# Patient Record
Sex: FEMALE | Race: WHITE | NOT HISPANIC OR LATINO | Employment: OTHER | URBAN - METROPOLITAN AREA
[De-identification: names, ages, dates, MRNs, and addresses within clinical notes are randomized per-mention and may not be internally consistent; named-entity substitution may affect disease eponyms.]

---

## 2018-12-20 ENCOUNTER — TELEPHONE (OUTPATIENT)
Dept: UROGYNECOLOGY | Facility: CLINIC | Age: 73
End: 2018-12-20

## 2018-12-20 NOTE — TELEPHONE ENCOUNTER
----- Message from Celena Thomas sent at 12/20/2018  4:01 PM CST -----  Contact: Yesy  Type: Needs Medical Advice    Who Called:  patient  Best Call Back Number: [phone]  Additional Information: patient has been referred by Dr. Murdock for a consult for a bladder lift--patient would like to get in as soon as possible but also has several questions regarding the procedure--please advise--thank you

## 2019-01-22 ENCOUNTER — INITIAL CONSULT (OUTPATIENT)
Dept: UROGYNECOLOGY | Facility: CLINIC | Age: 74
End: 2019-01-22
Payer: MEDICARE

## 2019-01-22 VITALS
DIASTOLIC BLOOD PRESSURE: 84 MMHG | SYSTOLIC BLOOD PRESSURE: 141 MMHG | HEIGHT: 65 IN | HEART RATE: 65 BPM | WEIGHT: 141.75 LBS | BODY MASS INDEX: 23.62 KG/M2

## 2019-01-22 DIAGNOSIS — N81.10 CYSTOCELE WITH RECTOCELE: ICD-10-CM

## 2019-01-22 DIAGNOSIS — R39.15 URINARY URGENCY: ICD-10-CM

## 2019-01-22 DIAGNOSIS — N81.6 CYSTOCELE WITH RECTOCELE: ICD-10-CM

## 2019-01-22 DIAGNOSIS — N81.4 UTERINE PROLAPSE: ICD-10-CM

## 2019-01-22 PROCEDURE — 99204 OFFICE O/P NEW MOD 45 MIN: CPT | Mod: S$PBB,,, | Performed by: OBSTETRICS & GYNECOLOGY

## 2019-01-22 PROCEDURE — 99213 OFFICE O/P EST LOW 20 MIN: CPT | Mod: PBBFAC,PO | Performed by: OBSTETRICS & GYNECOLOGY

## 2019-01-22 PROCEDURE — 99204 PR OFFICE/OUTPT VISIT, NEW, LEVL IV, 45-59 MIN: ICD-10-PCS | Mod: S$PBB,,, | Performed by: OBSTETRICS & GYNECOLOGY

## 2019-01-22 PROCEDURE — 99999 PR PBB SHADOW E&M-EST. PATIENT-LVL III: ICD-10-PCS | Mod: PBBFAC,,, | Performed by: OBSTETRICS & GYNECOLOGY

## 2019-01-22 PROCEDURE — 99999 PR PBB SHADOW E&M-EST. PATIENT-LVL III: CPT | Mod: PBBFAC,,, | Performed by: OBSTETRICS & GYNECOLOGY

## 2019-01-22 RX ORDER — RAMIPRIL 2.5 MG/1
2.5 CAPSULE ORAL DAILY
COMMUNITY
Start: 2018-12-04 | End: 2021-10-20 | Stop reason: SDUPTHER

## 2019-01-22 RX ORDER — ROSUVASTATIN CALCIUM 10 MG/1
10 TABLET, COATED ORAL
COMMUNITY
End: 2020-11-27

## 2019-01-22 RX ORDER — OMEGA-3-ACID ETHYL ESTERS 1 G/1
1 CAPSULE, LIQUID FILLED ORAL
COMMUNITY
End: 2019-03-21

## 2019-01-22 NOTE — LETTER
January 22, 2019      Saul Murdock MD  [address] [address]           [facility]  [address], [address]  [place] [address]  Phone: [phone]  Fax: [phone]          Patient: Yesy Knight   MR Number: [id number]   YOB: 1945   Date of Visit: 1/22/2019       Dear Dr. Saul Murdock:    Thank you for referring Yesy Knight to me for evaluation. Attached you will find relevant portions of my assessment and plan of care.    If you have questions, please do not hesitate to call me. I look forward to following Yesy Knight along with you.    Sincerely,    Rafael Guevara MD    Enclosure  CC:  No Recipients    If you would like to receive this communication electronically, please contact [email or web address] or [phone] to request more information on [facility] Link access.    For providers and/or their staff who would like to refer a patient to [facility], please contact us through our one-stop-shop provider referral line, [facility], at [phone].    If you feel you have received this communication in error or would no longer like to receive these types of communications, please e-mail [email or web address]

## 2019-01-22 NOTE — PROGRESS NOTES
Subjective:     Chief Complaint:   History of Present Illness: Yesy Knight is a 73 y.o. female patient of Dr. Murdock who presents for prolapse.  This is worsened over the past few years and she notices a balloon like defect at the introitus.  She does not light touch at so she is not exam did herself very well.  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  She has to squat to do this.  Her  has had a stroke so he cannot do the physical activity.  She is not sexually active because of his health issues.  She is not on any hormones presently.  She only has minimal incontinence if she waits too long but denies stress incontinence including with the heavy lifting.  She has no recent UTIs.  She does not want a vaginal mesh     Review of Systems    Constitutional: No acute distress. No weight gain/loss.  Eyes: No vision changes.  ENT: No headaches.   Respiratory: Non smoker  Cardiovascular: No chest pain. No edema. Hypertension  Gastrointestinal:  GERD   Genitourinary: No vaginal bleeding or discharge.  Integument/Breast: Negative  Hematologic/Lymphatic: No history of anemia.  Musculoskeletal: No major back pain. No abdominal pain.  Neurological: No known disc problems. No paresthesias.  Behavioral/Psych: No history of depression.   Endocrine: No hormonal replacement.  Allergy/Immune: no recent reactions     Objective:   General Exam:  General appearance: WDNF. NAD.   HEENT: Katie. EOM's intact.  Neck: Normal thyroid.   Back: No CVA tenderness.  RESP: No SOB.  Breasts: deferred  Abdomen: Benign without localizing signs.  Extremities: No edema. No varices.  Lymphatic: noncontributory  Skin: No rashes. No lesions.  Neurologic: Intact.   Psych: Oriented.   Pelvic Exam:  V:  No lesions. No palpable nodes.   Va:No d/c bleeding or lesions.  Dominant midline cystocele.Ba+1 standing.  Moderate rectocele  .Meatus:No caruncle or stenosis  Urethra: Non tender. No  suburethral masses.  Cx/Cuff: Normal   Uterus:  Prolapsesminus 5 position with elevation of the bladder  Ad: No mass or tenderness.  Levators :Symmetrical. Normal tone. Non tender.2/5 contraction  BL: Non tender  RV: No hemorrhoids.  Assessment:   Prolapse and pelvic relaxation.  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  She is not sexually active and partial colpocleisis may be ideal.  We talked about her options and risk involved.  Not a good candidate for pessary because of her physical activities.       Plan:    TVH,APR,partial colpocleisis pending CMG for occult incontinence

## 2019-02-06 ENCOUNTER — OFFICE VISIT (OUTPATIENT)
Dept: UROGYNECOLOGY | Facility: CLINIC | Age: 74
End: 2019-02-06
Payer: MEDICARE

## 2019-02-06 ENCOUNTER — APPOINTMENT (OUTPATIENT)
Dept: LAB | Facility: HOSPITAL | Age: 74
End: 2019-02-06
Attending: NURSE PRACTITIONER
Payer: MEDICARE

## 2019-02-06 VITALS
SYSTOLIC BLOOD PRESSURE: 149 MMHG | HEART RATE: 73 BPM | WEIGHT: 144.81 LBS | DIASTOLIC BLOOD PRESSURE: 89 MMHG | BODY MASS INDEX: 24.12 KG/M2 | HEIGHT: 65 IN

## 2019-02-06 DIAGNOSIS — R35.0 URINARY FREQUENCY: Primary | ICD-10-CM

## 2019-02-06 DIAGNOSIS — R31.21 ASYMPTOMATIC MICROSCOPIC HEMATURIA: ICD-10-CM

## 2019-02-06 DIAGNOSIS — N81.11 CYSTOCELE, MIDLINE: ICD-10-CM

## 2019-02-06 DIAGNOSIS — N81.4 UTERINE PROLAPSE: ICD-10-CM

## 2019-02-06 DIAGNOSIS — N81.6 RECTOCELE: ICD-10-CM

## 2019-02-06 LAB
BILIRUB SERPL-MCNC: ABNORMAL MG/DL
BLOOD URINE, POC: 250
COLOR, POC UA: YELLOW
GLUCOSE UR QL STRIP: ABNORMAL
KETONES UR QL STRIP: ABNORMAL
LEUKOCYTE ESTERASE URINE, POC: ABNORMAL
NITRITE, POC UA: ABNORMAL
PH, POC UA: 5
PROTEIN, POC: ABNORMAL
SPECIFIC GRAVITY, POC UA: 1.02
UROBILINOGEN, POC UA: ABNORMAL

## 2019-02-06 PROCEDURE — 87086 URINE CULTURE/COLONY COUNT: CPT

## 2019-02-06 PROCEDURE — 99214 OFFICE O/P EST MOD 30 MIN: CPT | Mod: S$PBB,25,, | Performed by: NURSE PRACTITIONER

## 2019-02-06 PROCEDURE — 81002 URINALYSIS NONAUTO W/O SCOPE: CPT | Mod: PBBFAC,PO | Performed by: NURSE PRACTITIONER

## 2019-02-06 PROCEDURE — 88112 CYTOPATH CELL ENHANCE TECH: CPT | Performed by: PATHOLOGY

## 2019-02-06 PROCEDURE — 99214 PR OFFICE/OUTPT VISIT, EST, LEVL IV, 30-39 MIN: ICD-10-PCS | Mod: S$PBB,25,, | Performed by: NURSE PRACTITIONER

## 2019-02-06 PROCEDURE — 51725 SIMPLE CYSTOMETROGRAM: CPT | Mod: PBBFAC,PO | Performed by: NURSE PRACTITIONER

## 2019-02-06 PROCEDURE — 51725 SIMPLE CYSTOMETROGRAM: CPT | Mod: 26,S$PBB,, | Performed by: NURSE PRACTITIONER

## 2019-02-06 PROCEDURE — 88112 CYTOLOGY SPECIMEN-URINE: ICD-10-PCS | Mod: 26,,, | Performed by: PATHOLOGY

## 2019-02-06 PROCEDURE — 99213 OFFICE O/P EST LOW 20 MIN: CPT | Mod: PBBFAC,PO | Performed by: NURSE PRACTITIONER

## 2019-02-06 PROCEDURE — 99999 PR PBB SHADOW E&M-EST. PATIENT-LVL III: CPT | Mod: PBBFAC,,, | Performed by: NURSE PRACTITIONER

## 2019-02-06 PROCEDURE — 99999 PR PBB SHADOW E&M-EST. PATIENT-LVL III: ICD-10-PCS | Mod: PBBFAC,,, | Performed by: NURSE PRACTITIONER

## 2019-02-06 PROCEDURE — 88112 CYTOPATH CELL ENHANCE TECH: CPT | Mod: 26,,, | Performed by: PATHOLOGY

## 2019-02-06 PROCEDURE — 51725 PR SIMPLE CYSTOMETROGRAM: ICD-10-PCS | Mod: 26,S$PBB,, | Performed by: NURSE PRACTITIONER

## 2019-02-06 NOTE — PROGRESS NOTES
Subjective:       Patient ID: Yesy Knight is a 73 y.o. female.    Chief Complaint: CMG    HPI  Yesy Knight is a 73 y.o. female who presents today for CMG.  She saw Dr. Guevara on 01/22/19.  She has surgery with Dr. Guevara scheduled for 02/22/19.  She denies any real changes in her symptoms since her first initial evaluation.  She denies any acute complaints/concerns and is ready to proceed with the CMG.    Review of Systems   Constitutional: Negative for activity change, fever and unexpected weight change.   HENT: Negative for hearing loss.    Eyes: Negative for visual disturbance.   Respiratory: Negative for shortness of breath and wheezing.    Cardiovascular: Negative for chest pain, palpitations and leg swelling.   Gastrointestinal: Negative for abdominal pain, constipation and diarrhea.   Genitourinary: Positive for frequency. Negative for dyspareunia, dysuria, urgency, vaginal bleeding and vaginal discharge.        Vaginal bulge   Musculoskeletal: Negative for gait problem and neck pain.   Skin: Negative for rash and wound.   Allergic/Immunologic: Negative for immunocompromised state.   Neurological: Negative for tremors, speech difficulty and weakness.   Hematological: Does not bruise/bleed easily.   Psychiatric/Behavioral: Negative for agitation and confusion.       Objective:      Physical Exam   Constitutional: She is oriented to person, place, and time. She appears well-developed and well-nourished. No distress.   HENT:   Head: Normocephalic and atraumatic.   Neck: Neck supple. No thyromegaly present.   Pulmonary/Chest: Effort normal. No respiratory distress.   Abdominal: Soft. There is no tenderness. No hernia.   Musculoskeletal: Normal range of motion.   Neurological: She is alert and oriented to person, place, and time.   Skin: Skin is warm and dry. No rash noted.   Psychiatric: She has a normal mood and affect. Her behavior is normal.     Pelvic Exam:  V: No lesions. No palpable nodes.   Va: no  discharge or bleeding.  Good length.  Dominant midline cystocele Ba=+1, rectocele Bp=-1.  Uterine prolapse with elevation of the bladder.  Meatus:No caruncle or stenosis  Urethra: Non tender. No suburethral masses.  Cx/Cuff: Normal   Uterus: uterine prolapse with elevation of bladder to -4 position  Ad: No mass or tenderness.  Levators :Symmetrical. Normal tone. Non tender.  BL: Non tender  RV: No hemorrhoids.      Assessment:       1. Urinary frequency    2. Cystocele, midline    3. Rectocele    4. Uterine prolapse    5. Asymptomatic microscopic hematuria          Procedure note- CMG-  A large cotton swab was inserted into the vagina to reduce the prolapse.  After betadine irrigation of the urethra, lidocaine instilled via urojet.  A #8 Maori catheter inserted into the bladder.  450 mls of urine withdrawn.  The catheter was then attached to sterile water and the water was allowed to flow into the bladder.  >40 cm of water closure pressure noted.  The pt was then asked to stand.  First sensation was at approx 400 mls.  Total bladder capacity was approx 750 mls.  The catheter was then withdrawn.  Pt asked to cough multiple times and had no incontinence.  The large cotton swab was removed and pt again asked to cough multiple times and had no incontinence.  Pt then allowed to  ambulate to the restroom.  Pt had no incontinence while ambulating and waiting for the restroom.    Plan:       Urinary frequency- NP will review CMG results with Dr. Guevara  -     POCT urine dipstick without microscope    Cystocele, midline- as noted above    Rectocele- as noted above    Uterine prolapse- as noted above    Asymptomatic microscopic hematuria- we will send her urine for culture and cytology on a catheterized specimen.    RTC PRN

## 2019-02-08 LAB — BACTERIA UR CULT: NO GROWTH

## 2019-02-14 ENCOUNTER — TELEPHONE (OUTPATIENT)
Dept: UROGYNECOLOGY | Facility: CLINIC | Age: 74
End: 2019-02-14

## 2019-02-14 NOTE — TELEPHONE ENCOUNTER
I reviewed her CMG results with Dr. Guevara.  She is going to have surgery next Thursday.  Dr. Guevara would like for her to come in before surgery and learn self cath.  With her increased residual and increased capacity she might have the suprapubic catheter longer than the majority of patients.  If she is able to learn self cath and do this at home, she will probably be able to get rid of the suprapubic catheter faster.

## 2019-02-19 ENCOUNTER — OFFICE VISIT (OUTPATIENT)
Dept: UROGYNECOLOGY | Facility: CLINIC | Age: 74
End: 2019-02-19
Payer: MEDICARE

## 2019-02-19 ENCOUNTER — HOSPITAL ENCOUNTER (OUTPATIENT)
Dept: PREADMISSION TESTING | Facility: HOSPITAL | Age: 74
Discharge: HOME | End: 2019-02-19
Attending: OBSTETRICS & GYNECOLOGY
Payer: MEDICARE

## 2019-02-19 VITALS
WEIGHT: 146.63 LBS | HEIGHT: 65 IN | HEART RATE: 72 BPM | DIASTOLIC BLOOD PRESSURE: 82 MMHG | SYSTOLIC BLOOD PRESSURE: 137 MMHG | BODY MASS INDEX: 24.43 KG/M2

## 2019-02-19 DIAGNOSIS — R35.0 URINARY FREQUENCY: Primary | ICD-10-CM

## 2019-02-19 DIAGNOSIS — N81.6 CYSTOCELE WITH RECTOCELE: ICD-10-CM

## 2019-02-19 DIAGNOSIS — N81.4 UTERINE PROLAPSE: ICD-10-CM

## 2019-02-19 DIAGNOSIS — N81.10 CYSTOCELE WITH RECTOCELE: Primary | ICD-10-CM

## 2019-02-19 DIAGNOSIS — N81.10 CYSTOCELE WITH RECTOCELE: ICD-10-CM

## 2019-02-19 DIAGNOSIS — R39.198 INCREASING RESIDUAL URINE: ICD-10-CM

## 2019-02-19 DIAGNOSIS — Z01.818 PRE-OP EXAM: ICD-10-CM

## 2019-02-19 DIAGNOSIS — N81.6 CYSTOCELE WITH RECTOCELE: Primary | ICD-10-CM

## 2019-02-19 LAB
ANION GAP SERPL CALC-SCNC: 7 MMOL/L
BACTERIA #/AREA URNS HPF: ABNORMAL /HPF
BASOPHILS # BLD AUTO: 0 K/UL
BASOPHILS NFR BLD: 0.2 %
BILIRUB UR QL STRIP: NEGATIVE
BUN SERPL-MCNC: 20 MG/DL
CALCIUM SERPL-MCNC: 10.2 MG/DL
CHLORIDE SERPL-SCNC: 105 MMOL/L
CLARITY UR: ABNORMAL
CO2 SERPL-SCNC: 27 MMOL/L
COLOR UR: YELLOW
CREAT SERPL-MCNC: 0.7 MG/DL
DIFFERENTIAL METHOD: ABNORMAL
EOSINOPHIL # BLD AUTO: 0.1 K/UL
EOSINOPHIL NFR BLD: 1.4 %
ERYTHROCYTE [DISTWIDTH] IN BLOOD BY AUTOMATED COUNT: 13.2 %
EST. GFR  (AFRICAN AMERICAN): >60 ML/MIN/1.73 M^2
EST. GFR  (NON AFRICAN AMERICAN): >60 ML/MIN/1.73 M^2
GLUCOSE SERPL-MCNC: 96 MG/DL
GLUCOSE UR QL STRIP: NEGATIVE
HCT VFR BLD AUTO: 42 %
HGB BLD-MCNC: 13.8 G/DL
HGB UR QL STRIP: ABNORMAL
KETONES UR QL STRIP: NEGATIVE
LEUKOCYTE ESTERASE UR QL STRIP: NEGATIVE
LYMPHOCYTES # BLD AUTO: 1.8 K/UL
LYMPHOCYTES NFR BLD: 27.8 %
MCH RBC QN AUTO: 31.1 PG
MCHC RBC AUTO-ENTMCNC: 32.8 G/DL
MCV RBC AUTO: 95 FL
MICROSCOPIC COMMENT: ABNORMAL
MONOCYTES # BLD AUTO: 0.4 K/UL
MONOCYTES NFR BLD: 6.9 %
NEUTROPHILS # BLD AUTO: 4 K/UL
NEUTROPHILS NFR BLD: 63.7 %
NITRITE UR QL STRIP: NEGATIVE
NON-SQ EPI CELLS #/AREA URNS HPF: 2 /HPF
PH UR STRIP: 6 [PH] (ref 5–8)
PLATELET # BLD AUTO: 249 K/UL
PMV BLD AUTO: 8.4 FL
POTASSIUM SERPL-SCNC: 4.8 MMOL/L
PROT UR QL STRIP: NEGATIVE
RBC # BLD AUTO: 4.43 M/UL
RBC #/AREA URNS HPF: 2 /HPF (ref 0–4)
SODIUM SERPL-SCNC: 139 MMOL/L
SP GR UR STRIP: 1.01 (ref 1–1.03)
SQUAMOUS #/AREA URNS HPF: 1 /HPF
URN SPEC COLLECT METH UR: ABNORMAL
UROBILINOGEN UR STRIP-ACNC: NEGATIVE EU/DL
WBC # BLD AUTO: 6.3 K/UL
WBC #/AREA URNS HPF: 2 /HPF (ref 0–5)

## 2019-02-19 PROCEDURE — 80048 BASIC METABOLIC PNL TOTAL CA: CPT

## 2019-02-19 PROCEDURE — 99900104 DSU ONLY-NO CHARGE-EA ADD'L HR (STAT)

## 2019-02-19 PROCEDURE — 99213 OFFICE O/P EST LOW 20 MIN: CPT | Mod: PBBFAC,PO | Performed by: OBSTETRICS & GYNECOLOGY

## 2019-02-19 PROCEDURE — 36415 COLL VENOUS BLD VENIPUNCTURE: CPT

## 2019-02-19 PROCEDURE — 99213 PR OFFICE/OUTPT VISIT, EST, LEVL III, 20-29 MIN: ICD-10-PCS | Mod: S$PBB,,, | Performed by: OBSTETRICS & GYNECOLOGY

## 2019-02-19 PROCEDURE — 99900103 DSU ONLY-NO CHARGE-INITIAL HR (STAT)

## 2019-02-19 PROCEDURE — 85025 COMPLETE CBC W/AUTO DIFF WBC: CPT

## 2019-02-19 PROCEDURE — 99999 PR PBB SHADOW E&M-EST. PATIENT-LVL III: ICD-10-PCS | Mod: PBBFAC,,, | Performed by: OBSTETRICS & GYNECOLOGY

## 2019-02-19 PROCEDURE — 81000 URINALYSIS NONAUTO W/SCOPE: CPT

## 2019-02-19 PROCEDURE — 99213 OFFICE O/P EST LOW 20 MIN: CPT | Mod: S$PBB,,, | Performed by: OBSTETRICS & GYNECOLOGY

## 2019-02-19 PROCEDURE — 99999 PR PBB SHADOW E&M-EST. PATIENT-LVL III: CPT | Mod: PBBFAC,,, | Performed by: OBSTETRICS & GYNECOLOGY

## 2019-02-19 NOTE — H&P (VIEW-ONLY)
Subjective:     Chief Complaint:  Prolapse  History of Present Illness: Yesy Knight is a 73 y.o. female who presents for prolapse with dominant cystocele.  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  She wants surgery rather than pessary.  She is not sexually active because of her 's medical issues.  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  She denies stress incontinence.  On CMG she had residual of 450 and capacity of 750 but negative stress test.        Review of Systems    Constitutional: No acute distress. No weight gain/loss.  Eyes: No vision changes.  ENT: No headaches.   Respiratory: No SOB.  Cardiovascular: No chest pain. No edema. Hypertension  Gastrointestinal:  GERD  Genitourinary: No vaginal bleeding or discharge.  Integument/Breast: Negative  Hematologic/Lymphatic: No history of anemia.  Musculoskeletal: OA  Neurological: No known disc problems. No paresthesias.  Behavioral/Psych: No history of depression.   Endocrine: No hormonal replacement.  Allergy/Immune: no recent reactions     Objective:   General Exam:  General appearance: WDNF. NAD.   HEENT: Katie. EOM's intact.  Neck: Normal thyroid.   Back: No CVA tenderness.  RESP: No SOB.  Breasts: deferred  Abdomen: Benign without localizing signs.  Extremities: No edema. No varices.  Lymphatic: noncontributory  Skin: No rashes. No lesions.  Neurologic: Intact.   Psych: Oriented.   Pelvic Exam:  V:  No lesions. No palpable nodes.   Va:No d/c bleeding or lesions.  Dominant right lateral cystocele to a 0 position   .Meatus:No caruncle or stenosis  Urethra: Non tender. No suburethral masses.  Cx/Cuff: Normal   Uterus:  Prolapses to a minus 4 position  Ad: No mass or tenderness.  Levators :Symmetrical. Normal tone. Non tender.2/5 contractions  BL: Non tender  RV: No hemorrhoids.  Assessment:   Dominant cystocele but diffuse relaxation including uterine prolapse.  No clinical incontinence and  a  negative stress test so she is unlikely to have postop  incontinence   Elevated residual and borderline capacity    Plan:    TVH, APR.  Because of her significant physical activity we will likely narrow the apex and do sacrospinous fixation.  Will only do BSO if there are noted abnormalities  She will be taught self catheterization preop because of her elevated residual

## 2019-02-19 NOTE — DISCHARGE INSTRUCTIONS
To confirm, Your doctor has instructed you that surgery is scheduled for:    2/21/19                  Joya [phone]    Please report to [facility] the morning of surgery. You must check-in and receive a wristband before going to your procedure.    Pre-Op will call the afternoon prior to surgery between 1:00 and 6:00 PM with the final arrival time.  Phone number: [phone]    PLEASE NOTE:  The surgery schedule has many variables which may affect the time of your surgery case.  Family members should be available if your surgery time changes.  Plan to be here the day of your procedure between 4-6 hours.    MEDICATIONS:  TAKE ONLY THESE MEDICATIONS WITH A SMALL SIP OF WATER THE MORNING OF YOUR PROCEDURE:  -0-    DO NOT TAKE THESE MEDICATIONS 5-7 DAYS PRIOR to your procedure or per your surgeon's request: ASPIRIN, ALEVE, ADVIL, IBUPROFEN, FISH OIL VITAMIN E, HERBALS  (May take Tylenol)                                      NO RAMIPRIL AM of Surgery    ONLY if you are prescribed any types of blood thinners such as:  Aspirin, Coumadin, Plavix, Pradaxa, Xarelto, Aggrenox, Effient, Eliquis, Savasya, Brilinta, or any other, ask your surgeon whether you should stop taking them and how long before surgery you should stop.  You may also need to verify with the prescribing physician if it is ok to stop your medication.      INSTRUCTIONS IMPORTANT!!  · Do not eat or drink anything between midnight and the time of your procedure- this includes gum, mints, and candy.  · Do not smoke or drink alcoholic beverages 24 hours prior to your procedure.  · Shower the night before AND the morning of your procedure with a Chlorhexidine wash such as Hibiclens or Dial antibacterial soap from the neck down.  Do not get it on your face or in your eyes.  You may use your own shampoo and face wash. This helps your skin to be as bacteria free as possible.    · If you wear contact lenses, dentures, hearing  aids or glasses, bring a container to put them in during surgery and give to a family member for safe keeping.  Please leave all jewelry, piercing's and valuables at home.   · DO NOT remove hair from the surgery site.  Do not shave the incision site unless you are given specific instructions to do so.    · ONLY if you have been diagnosed with sleep apnea please bring your C-PAP machine.  · ONLY if you wear home oxygen please bring your portable oxygen tank the day of your procedure.  · ONLY if you have a history of OPEN HEART SURGERY you will need a clearance from your Cardiologist per Anesthesia.      · ONLY for patients requiring bowel prep, written instructions will be given by your doctor's office.  · ONLY if you have a neuro stimulator, please bring the controller with you the morning of surgery  · ONLY if a type and screen test is needed before surgery, please return:  · If your doctor has scheduled you for an overnight stay, bring a small overnight bag with any personal items you need.  · Make arrangements in advance for transportation home by a responsible adult.  It is not safe to drive a vehicle during the 24 hours after anesthesia.      · Visiting hours are 10:00AM to 8:30PM.  For the safety of all patients, visitors under the age of 12 are not allowed above the first floor of the hospital.    · All [facility] facilities and properties are tobacco free.  Smoking is NOT allowed.       If you have any questions about these instructions, call Pre-Op Admit  Nursing at [phone] or the Pre-Op Day Surgery Unit at [phone].

## 2019-02-20 ENCOUNTER — ANESTHESIA EVENT (OUTPATIENT)
Dept: SURGERY | Facility: HOSPITAL | Age: 74
End: 2019-02-20
Payer: MEDICARE

## 2019-02-21 ENCOUNTER — HOSPITAL ENCOUNTER (OUTPATIENT)
Facility: HOSPITAL | Age: 74
Discharge: HOME | End: 2019-02-22
Attending: OBSTETRICS & GYNECOLOGY | Admitting: OBSTETRICS & GYNECOLOGY
Payer: MEDICARE

## 2019-02-21 ENCOUNTER — ANESTHESIA (OUTPATIENT)
Dept: SURGERY | Facility: HOSPITAL | Age: 74
End: 2019-02-21
Payer: MEDICARE

## 2019-02-21 DIAGNOSIS — N81.10 CYSTOCELE WITH RECTOCELE: Primary | ICD-10-CM

## 2019-02-21 DIAGNOSIS — N81.6 CYSTOCELE WITH RECTOCELE: Primary | ICD-10-CM

## 2019-02-21 PROCEDURE — 99900103 DSU ONLY-NO CHARGE-INITIAL HR (STAT): Performed by: OBSTETRICS & GYNECOLOGY

## 2019-02-21 PROCEDURE — 99900104 DSU ONLY-NO CHARGE-EA ADD'L HR (STAT): Performed by: OBSTETRICS & GYNECOLOGY

## 2019-02-21 PROCEDURE — 58260 VAGINAL HYSTERECTOMY: CPT | Mod: 51,ICN,, | Performed by: OBSTETRICS & GYNECOLOGY

## 2019-02-21 PROCEDURE — 25000003 PHARM REV CODE 250: Performed by: ANESTHESIOLOGY

## 2019-02-21 PROCEDURE — 25000003 PHARM REV CODE 250: Performed by: NURSE ANESTHETIST, CERTIFIED REGISTERED

## 2019-02-21 PROCEDURE — D9220A PRA ANESTHESIA: Mod: CRNA,,, | Performed by: NURSE ANESTHETIST, CERTIFIED REGISTERED

## 2019-02-21 PROCEDURE — 57282 PR REVAGINAL PROLAPSE,SACROSP LIG: ICD-10-PCS | Mod: 59,ICN,, | Performed by: OBSTETRICS & GYNECOLOGY

## 2019-02-21 PROCEDURE — 25000003 PHARM REV CODE 250: Performed by: OBSTETRICS & GYNECOLOGY

## 2019-02-21 PROCEDURE — 37000008 HC ANESTHESIA 1ST 15 MINUTES: Performed by: OBSTETRICS & GYNECOLOGY

## 2019-02-21 PROCEDURE — C2627 CATH, SUPRAPUBIC/CYSTOSCOPIC: HCPCS | Performed by: OBSTETRICS & GYNECOLOGY

## 2019-02-21 PROCEDURE — 88305 TISSUE EXAM BY PATHOLOGIST: CPT | Mod: 26,,, | Performed by: PATHOLOGY

## 2019-02-21 PROCEDURE — D9220A PRA ANESTHESIA: ICD-10-PCS | Mod: ANES,,, | Performed by: ANESTHESIOLOGY

## 2019-02-21 PROCEDURE — 63600175 PHARM REV CODE 636 W HCPCS: Performed by: OBSTETRICS & GYNECOLOGY

## 2019-02-21 PROCEDURE — 88305 TISSUE SPECIMEN TO PATHOLOGY - SURGERY: ICD-10-PCS | Mod: 26,,, | Performed by: PATHOLOGY

## 2019-02-21 PROCEDURE — 36000708 HC OR TIME LEV III 1ST 15 MIN: Performed by: OBSTETRICS & GYNECOLOGY

## 2019-02-21 PROCEDURE — 88305 TISSUE EXAM BY PATHOLOGIST: CPT | Performed by: PATHOLOGY

## 2019-02-21 PROCEDURE — 71000033 HC RECOVERY, INTIAL HOUR: Performed by: OBSTETRICS & GYNECOLOGY

## 2019-02-21 PROCEDURE — 63600175 PHARM REV CODE 636 W HCPCS: Performed by: NURSE ANESTHETIST, CERTIFIED REGISTERED

## 2019-02-21 PROCEDURE — 37000009 HC ANESTHESIA EA ADD 15 MINS: Performed by: OBSTETRICS & GYNECOLOGY

## 2019-02-21 PROCEDURE — 57260 PR COMBINED ANT/POST COLPORRHAPHY: ICD-10-PCS | Mod: ICN,,, | Performed by: OBSTETRICS & GYNECOLOGY

## 2019-02-21 PROCEDURE — 51102 DRAIN BL W/CATH INSERTION: CPT | Mod: ICN,,, | Performed by: OBSTETRICS & GYNECOLOGY

## 2019-02-21 PROCEDURE — D9220A PRA ANESTHESIA: ICD-10-PCS | Mod: CRNA,,, | Performed by: NURSE ANESTHETIST, CERTIFIED REGISTERED

## 2019-02-21 PROCEDURE — C2631 REP DEV, URINARY, W/O SLING: HCPCS | Performed by: OBSTETRICS & GYNECOLOGY

## 2019-02-21 PROCEDURE — 63600175 PHARM REV CODE 636 W HCPCS: Performed by: ANESTHESIOLOGY

## 2019-02-21 PROCEDURE — 51102 PR ASPIRATION BLADDER INSERT SUPRAPUBIC CATHETER: ICD-10-PCS | Mod: ICN,,, | Performed by: OBSTETRICS & GYNECOLOGY

## 2019-02-21 PROCEDURE — 36000709 HC OR TIME LEV III EA ADD 15 MIN: Performed by: OBSTETRICS & GYNECOLOGY

## 2019-02-21 PROCEDURE — D9220A PRA ANESTHESIA: Mod: ANES,,, | Performed by: ANESTHESIOLOGY

## 2019-02-21 PROCEDURE — S5010 5% DEXTROSE AND 0.45% SALINE: HCPCS | Performed by: OBSTETRICS & GYNECOLOGY

## 2019-02-21 PROCEDURE — 57260 CMBN ANT PST COLPRHY: CPT | Mod: ICN,,, | Performed by: OBSTETRICS & GYNECOLOGY

## 2019-02-21 PROCEDURE — 57282 COLPOPEXY EXTRAPERITONEAL: CPT | Mod: 59,ICN,, | Performed by: OBSTETRICS & GYNECOLOGY

## 2019-02-21 PROCEDURE — 58260 PR VAGINAL HYSTERECTOMY,UTERUS 250 GMS/<: ICD-10-PCS | Mod: 51,ICN,, | Performed by: OBSTETRICS & GYNECOLOGY

## 2019-02-21 PROCEDURE — 71000039 HC RECOVERY, EACH ADD'L HOUR: Performed by: OBSTETRICS & GYNECOLOGY

## 2019-02-21 RX ORDER — ONDANSETRON 2 MG/ML
INJECTION INTRAMUSCULAR; INTRAVENOUS
Status: DISCONTINUED | OUTPATIENT
Start: 2019-02-21 | End: 2019-02-21

## 2019-02-21 RX ORDER — MORPHINE SULFATE 4 MG/ML
4 INJECTION, SOLUTION INTRAMUSCULAR; INTRAVENOUS
Status: DISCONTINUED | OUTPATIENT
Start: 2019-02-21 | End: 2019-02-22 | Stop reason: HOSPADM

## 2019-02-21 RX ORDER — ONDANSETRON 8 MG/1
8 TABLET, ORALLY DISINTEGRATING ORAL EVERY 8 HOURS PRN
Status: DISCONTINUED | OUTPATIENT
Start: 2019-02-21 | End: 2019-02-22 | Stop reason: HOSPADM

## 2019-02-21 RX ORDER — DEXAMETHASONE SODIUM PHOSPHATE 4 MG/ML
INJECTION, SOLUTION INTRA-ARTICULAR; INTRALESIONAL; INTRAMUSCULAR; INTRAVENOUS; SOFT TISSUE
Status: DISCONTINUED | OUTPATIENT
Start: 2019-02-21 | End: 2019-02-21

## 2019-02-21 RX ORDER — DEXTROSE MONOHYDRATE AND SODIUM CHLORIDE 5; .45 G/100ML; G/100ML
INJECTION, SOLUTION INTRAVENOUS CONTINUOUS
Status: DISCONTINUED | OUTPATIENT
Start: 2019-02-21 | End: 2019-02-22 | Stop reason: HOSPADM

## 2019-02-21 RX ORDER — ACETAMINOPHEN 10 MG/ML
INJECTION, SOLUTION INTRAVENOUS
Status: DISCONTINUED | OUTPATIENT
Start: 2019-02-21 | End: 2019-02-21

## 2019-02-21 RX ORDER — FENTANYL CITRATE 50 UG/ML
INJECTION, SOLUTION INTRAMUSCULAR; INTRAVENOUS
Status: DISCONTINUED | OUTPATIENT
Start: 2019-02-21 | End: 2019-02-21

## 2019-02-21 RX ORDER — DIPHENHYDRAMINE HYDROCHLORIDE 50 MG/ML
25 INJECTION INTRAMUSCULAR; INTRAVENOUS EVERY 4 HOURS PRN
Status: DISCONTINUED | OUTPATIENT
Start: 2019-02-21 | End: 2019-02-22 | Stop reason: HOSPADM

## 2019-02-21 RX ORDER — ROSUVASTATIN CALCIUM 5 MG/1
10 TABLET, COATED ORAL DAILY
Status: DISCONTINUED | OUTPATIENT
Start: 2019-02-21 | End: 2019-02-22 | Stop reason: HOSPADM

## 2019-02-21 RX ORDER — RAMIPRIL 2.5 MG/1
2.5 CAPSULE ORAL DAILY
Status: DISCONTINUED | OUTPATIENT
Start: 2019-02-21 | End: 2019-02-22 | Stop reason: HOSPADM

## 2019-02-21 RX ORDER — BUPIVACAINE HYDROCHLORIDE AND EPINEPHRINE 2.5; 5 MG/ML; UG/ML
INJECTION, SOLUTION EPIDURAL; INFILTRATION; INTRACAUDAL; PERINEURAL
Status: DISCONTINUED | OUTPATIENT
Start: 2019-02-21 | End: 2019-02-21 | Stop reason: HOSPADM

## 2019-02-21 RX ORDER — ONDANSETRON 2 MG/ML
4 INJECTION INTRAMUSCULAR; INTRAVENOUS ONCE AS NEEDED
Status: DISCONTINUED | OUTPATIENT
Start: 2019-02-21 | End: 2019-02-21 | Stop reason: HOSPADM

## 2019-02-21 RX ORDER — AMOXICILLIN 250 MG
1 CAPSULE ORAL 2 TIMES DAILY
Status: DISCONTINUED | OUTPATIENT
Start: 2019-02-21 | End: 2019-02-22 | Stop reason: HOSPADM

## 2019-02-21 RX ORDER — PROPOFOL 10 MG/ML
VIAL (ML) INTRAVENOUS
Status: DISCONTINUED | OUTPATIENT
Start: 2019-02-21 | End: 2019-02-21

## 2019-02-21 RX ORDER — EPHEDRINE SULFATE 50 MG/ML
INJECTION, SOLUTION INTRAVENOUS
Status: DISCONTINUED | OUTPATIENT
Start: 2019-02-21 | End: 2019-02-21

## 2019-02-21 RX ORDER — LIDOCAINE HYDROCHLORIDE 10 MG/ML
1 INJECTION, SOLUTION EPIDURAL; INFILTRATION; INTRACAUDAL; PERINEURAL ONCE
Status: DISCONTINUED | OUTPATIENT
Start: 2019-02-21 | End: 2019-02-21 | Stop reason: HOSPADM

## 2019-02-21 RX ORDER — OXYCODONE HYDROCHLORIDE 5 MG/1
5 TABLET ORAL ONCE AS NEEDED
Status: COMPLETED | OUTPATIENT
Start: 2019-02-21 | End: 2019-02-21

## 2019-02-21 RX ORDER — SODIUM CHLORIDE, SODIUM LACTATE, POTASSIUM CHLORIDE, CALCIUM CHLORIDE 600; 310; 30; 20 MG/100ML; MG/100ML; MG/100ML; MG/100ML
125 INJECTION, SOLUTION INTRAVENOUS CONTINUOUS
Status: DISCONTINUED | OUTPATIENT
Start: 2019-02-21 | End: 2019-02-22 | Stop reason: HOSPADM

## 2019-02-21 RX ORDER — LIDOCAINE HCL/PF 100 MG/5ML
SYRINGE (ML) INTRAVENOUS
Status: DISCONTINUED | OUTPATIENT
Start: 2019-02-21 | End: 2019-02-21

## 2019-02-21 RX ORDER — FENTANYL CITRATE 50 UG/ML
25 INJECTION, SOLUTION INTRAMUSCULAR; INTRAVENOUS EVERY 5 MIN PRN
Status: DISCONTINUED | OUTPATIENT
Start: 2019-02-21 | End: 2019-02-21 | Stop reason: HOSPADM

## 2019-02-21 RX ORDER — IBUPROFEN 600 MG/1
600 TABLET ORAL EVERY 6 HOURS
Status: DISCONTINUED | OUTPATIENT
Start: 2019-02-22 | End: 2019-02-22 | Stop reason: HOSPADM

## 2019-02-21 RX ORDER — LIDOCAINE HYDROCHLORIDE 20 MG/ML
JELLY TOPICAL
Status: DISCONTINUED | OUTPATIENT
Start: 2019-02-21 | End: 2019-02-21 | Stop reason: HOSPADM

## 2019-02-21 RX ORDER — GLYCOPYRROLATE 0.2 MG/ML
INJECTION INTRAMUSCULAR; INTRAVENOUS
Status: DISCONTINUED | OUTPATIENT
Start: 2019-02-21 | End: 2019-02-21

## 2019-02-21 RX ORDER — ATROPA BELLADONNA AND OPIUM 16.2; 6 MG/1; MG/1
SUPPOSITORY RECTAL
Status: DISCONTINUED | OUTPATIENT
Start: 2019-02-21 | End: 2019-02-21 | Stop reason: HOSPADM

## 2019-02-21 RX ORDER — SODIUM CHLORIDE, SODIUM LACTATE, POTASSIUM CHLORIDE, CALCIUM CHLORIDE 600; 310; 30; 20 MG/100ML; MG/100ML; MG/100ML; MG/100ML
INJECTION, SOLUTION INTRAVENOUS CONTINUOUS
Status: DISCONTINUED | OUTPATIENT
Start: 2019-02-21 | End: 2019-02-21

## 2019-02-21 RX ORDER — CEFAZOLIN SODIUM 2 G/50ML
2 SOLUTION INTRAVENOUS
Status: COMPLETED | OUTPATIENT
Start: 2019-02-21 | End: 2019-02-21

## 2019-02-21 RX ORDER — OXYCODONE AND ACETAMINOPHEN 5; 325 MG/1; MG/1
1 TABLET ORAL EVERY 4 HOURS PRN
Status: DISCONTINUED | OUTPATIENT
Start: 2019-02-21 | End: 2019-02-22 | Stop reason: HOSPADM

## 2019-02-21 RX ORDER — MIDAZOLAM HYDROCHLORIDE 1 MG/ML
INJECTION, SOLUTION INTRAMUSCULAR; INTRAVENOUS
Status: DISCONTINUED | OUTPATIENT
Start: 2019-02-21 | End: 2019-02-21

## 2019-02-21 RX ORDER — ROCURONIUM BROMIDE 10 MG/ML
INJECTION, SOLUTION INTRAVENOUS
Status: DISCONTINUED | OUTPATIENT
Start: 2019-02-21 | End: 2019-02-21

## 2019-02-21 RX ADMIN — DEXTROSE AND SODIUM CHLORIDE: 5; .45 INJECTION, SOLUTION INTRAVENOUS at 11:02

## 2019-02-21 RX ADMIN — ONDANSETRON 4 MG: 2 INJECTION, SOLUTION INTRAMUSCULAR; INTRAVENOUS at 10:02

## 2019-02-21 RX ADMIN — SODIUM CHLORIDE, SODIUM LACTATE, POTASSIUM CHLORIDE, AND CALCIUM CHLORIDE: .6; .31; .03; .02 INJECTION, SOLUTION INTRAVENOUS at 07:02

## 2019-02-21 RX ADMIN — ROSUVASTATIN CALCIUM 10 MG: 5 TABLET, FILM COATED ORAL at 01:02

## 2019-02-21 RX ADMIN — ROCURONIUM BROMIDE 40 MG: 10 INJECTION, SOLUTION INTRAVENOUS at 07:02

## 2019-02-21 RX ADMIN — DEXTROSE AND SODIUM CHLORIDE: 5; .45 INJECTION, SOLUTION INTRAVENOUS at 07:02

## 2019-02-21 RX ADMIN — EPHEDRINE SULFATE 10 MG: 50 INJECTION, SOLUTION INTRAMUSCULAR; INTRAVENOUS; SUBCUTANEOUS at 08:02

## 2019-02-21 RX ADMIN — PROPOFOL 120 MG: 10 INJECTION, EMULSION INTRAVENOUS at 07:02

## 2019-02-21 RX ADMIN — MIDAZOLAM 2 MG: 1 INJECTION INTRAMUSCULAR; INTRAVENOUS at 07:02

## 2019-02-21 RX ADMIN — FENTANYL CITRATE 100 MCG: 50 INJECTION, SOLUTION INTRAMUSCULAR; INTRAVENOUS at 07:02

## 2019-02-21 RX ADMIN — EPHEDRINE SULFATE 10 MG: 50 INJECTION, SOLUTION INTRAMUSCULAR; INTRAVENOUS; SUBCUTANEOUS at 09:02

## 2019-02-21 RX ADMIN — OXYCODONE AND ACETAMINOPHEN 1 TABLET: 5; 325 TABLET ORAL at 09:02

## 2019-02-21 RX ADMIN — SODIUM CHLORIDE, SODIUM LACTATE, POTASSIUM CHLORIDE, AND CALCIUM CHLORIDE: .6; .31; .03; .02 INJECTION, SOLUTION INTRAVENOUS at 09:02

## 2019-02-21 RX ADMIN — ACETAMINOPHEN 1000 MG: 10 INJECTION, SOLUTION INTRAVENOUS at 08:02

## 2019-02-21 RX ADMIN — LIDOCAINE HYDROCHLORIDE 80 MG: 20 INJECTION, SOLUTION INTRAVENOUS at 07:02

## 2019-02-21 RX ADMIN — CEFAZOLIN SODIUM 2 G: 2 SOLUTION INTRAVENOUS at 07:02

## 2019-02-21 RX ADMIN — ONDANSETRON 4 MG: 2 INJECTION, SOLUTION INTRAMUSCULAR; INTRAVENOUS at 08:02

## 2019-02-21 RX ADMIN — GLYCOPYRROLATE 0.2 MG: 0.2 INJECTION, SOLUTION INTRAMUSCULAR; INTRAVENOUS at 07:02

## 2019-02-21 RX ADMIN — DEXAMETHASONE SODIUM PHOSPHATE 8 MG: 4 INJECTION, SOLUTION INTRAMUSCULAR; INTRAVENOUS at 08:02

## 2019-02-21 RX ADMIN — EPHEDRINE SULFATE 5 MG: 50 INJECTION, SOLUTION INTRAMUSCULAR; INTRAVENOUS; SUBCUTANEOUS at 08:02

## 2019-02-21 RX ADMIN — OXYCODONE HYDROCHLORIDE 5 MG: 5 TABLET ORAL at 10:02

## 2019-02-21 RX ADMIN — STANDARDIZED SENNA CONCENTRATE AND DOCUSATE SODIUM 1 TABLET: 8.6; 5 TABLET, FILM COATED ORAL at 09:02

## 2019-02-21 RX ADMIN — ROCURONIUM BROMIDE 10 MG: 10 INJECTION, SOLUTION INTRAVENOUS at 08:02

## 2019-02-21 RX ADMIN — SODIUM CHLORIDE, SODIUM LACTATE, POTASSIUM CHLORIDE, AND CALCIUM CHLORIDE: .6; .31; .03; .02 INJECTION, SOLUTION INTRAVENOUS at 08:02

## 2019-02-21 RX ADMIN — RAMIPRIL 2.5 MG: 2.5 CAPSULE ORAL at 01:02

## 2019-02-21 RX ADMIN — FENTANYL CITRATE 25 MCG: 50 INJECTION INTRAMUSCULAR; INTRAVENOUS at 10:02

## 2019-02-21 RX ADMIN — STANDARDIZED SENNA CONCENTRATE AND DOCUSATE SODIUM 1 TABLET: 8.6; 5 TABLET, FILM COATED ORAL at 01:02

## 2019-02-21 NOTE — OR NURSING
Pt. Is AAOx4, calm and cooperative.  Breathing unlabored on room air.  VS wnl.  RN reviewed plan of care with pt. And daughter who both verbalized understanding.  Questions answered, comfort assured.  Prepared for surgery.

## 2019-02-21 NOTE — ANESTHESIA POSTPROCEDURE EVALUATION
Anesthesia Post Evaluation    Patient: Yesy Knight    Procedure(s) Performed: Procedure(s) (LRB):  HYSTERECTOMY, TOTAL, VAGINAL (N/A)  COLPORRHAPHY (N/A)  CYSTOSCOPY (N/A)  FIXATION, LIGAMENT, SACROSPINOUS    Final Anesthesia Type: general  Patient location during evaluation: PACU  Patient participation: Yes- Able to Participate  Level of consciousness: awake and alert  Post-procedure vital signs: reviewed and stable  Pain management: adequate  Airway patency: patent  PONV status at discharge: No PONV  Anesthetic complications: no      Cardiovascular status: hemodynamically stable  Respiratory status: unassisted and room air  Hydration status: euvolemic  Follow-up not needed.        Visit Vitals  BP (!) 105/59 (Patient Position: Lying)   Pulse 79   Temp 36.6 °C (97.9 °F) (Oral)   Resp 16   SpO2 95%       Pain/Colby Score: Pain Rating Prior to Med Admin: 7 (2/21/2019 10:56 AM)  Pain Rating Post Med Admin: 4 (2/21/2019 11:15 AM)  Colby Score: 10 (2/21/2019 11:15 AM)

## 2019-02-21 NOTE — TRANSFER OF CARE
Anesthesia Transfer of Care Note    Patient: Yesy Knight    Procedure(s) Performed: Procedure(s) (LRB):  HYSTERECTOMY, TOTAL, VAGINAL (N/A)  COLPORRHAPHY (N/A)  CYSTOSCOPY (N/A)  FIXATION, LIGAMENT, SACROSPINOUS    Patient location: PACU    Anesthesia Type: general    Transport from OR: Transported from OR on 2-3 L/min O2 by NC with adequate spontaneous ventilation    Post pain: adequate analgesia    Post assessment: no apparent anesthetic complications    Post vital signs: stable    Level of consciousness: sedated    Nausea/Vomiting: no nausea/vomiting    Complications: none    Transfer of care protocol was followed      Last vitals:   Visit Vitals  BP (!) 151/69 (BP Location: Left arm, Patient Position: Lying)   Pulse 70   Temp 36.7 °C (98.1 °F)   Resp 16

## 2019-02-21 NOTE — ANESTHESIA PREPROCEDURE EVALUATION
02/21/2019  eYsy Knight is a 73 y.o., female.    Anesthesia Evaluation    I have reviewed the Patient Summary Reports.    I have reviewed the Nursing Notes.   I have reviewed the Medications.     Review of Systems  Anesthesia Hx:  No problems with previous Anesthesia    Social:  Non-Smoker    Hematology/Oncology:  Hematology Normal   Oncology Normal     EENT/Dental:   Partial dentures   Cardiovascular:   Hypertension    Pulmonary:  Pulmonary Normal    Renal/:   Cystocele    Hepatic/GI:  Hepatic/GI Normal    Musculoskeletal:  Musculoskeletal Normal    Neurological:  Neurology Normal    Dermatological:  Skin Normal    Psych:  Psychiatric Normal           Physical Exam  General:  Well nourished    Airway/Jaw/Neck:  Airway Findings: Mouth Opening: Normal Tongue: Normal  General Airway Assessment: Adult  Mallampati: II  TM Distance: Normal, at least 6 cm        Eyes/Ears/Nose:  EYES/EARS/NOSE FINDINGS: Normal   Dental:  Dental Findings: lower partial dentures   Chest/Lungs:  Chest/Lungs Findings: Clear to auscultation, Normal Respiratory Rate     Heart/Vascular:  Heart Findings: Rate: Normal  Rhythm: Regular Rhythm        Mental Status:  Mental Status Findings:  Cooperative, Alert and Oriented         Anesthesia Plan  Type of Anesthesia, risks & benefits discussed:  Anesthesia Type:  general  Patient's Preference:   Intra-op Monitoring Plan: standard ASA monitors  Intra-op Monitoring Plan Comments:   Post Op Pain Control Plan: IV/PO Opioids PRN  Post Op Pain Control Plan Comments:   Induction:   IV  Beta Blocker:  Patient is not currently on a Beta-Blocker (No further documentation required).       Informed Consent: Patient understands risks and agrees with Anesthesia plan.  Questions answered. Anesthesia consent signed with patient.  ASA Score: 2     Day of Surgery Review of History & Physical: I have  interviewed and examined the patient. I have reviewed the patient's H&P dated:  There are no significant changes.  H&P update referred to the surgeon.         Ready For Surgery From Anesthesia Perspective.

## 2019-02-21 NOTE — BRIEF OP NOTE
[facility]  Brief Operative Note    SUMMARY     Surgery Date: 2/21/2019     Surgeon(s) and Role:     * Rafael Guevara MD - Primary     * Saul Velasquez MD - Assisting        Pre-op Diagnosis:  Cystocele with rectocele [N81.10, N81.6],uterine prolapse.increasing residual urine    Post-op Diagnosis:  Post-Op Diagnosis Codes:same as preop     * Cystocele with rectocele [N81.10, N81.6]    Procedure(s) (LRB):  HYSTERECTOMY, TOTAL, VAGINAL (N/A)  COLPORRHAPHY (N/A)  CYSTOSCOPY (N/A)  FIXATION, LIGAMENT, SACROSPINOUS  culdoplasty  Anesthesia: General    Description of Procedure: TVH APR, Rueda culdoplasy, SSLF,SP cath    Description of the findings of the procedure: normal ovaries,ureters    Estimated Blood Loss: 100 mL         Specimens: uterus  Specimen (12h ago, onward)    Start     Ordered    02/21/19 1017  Specimen to Pathology - Surgery  Once     Comments:  Pre-op Diagnosis: Cystocele with rectocele [N81.10, N81.6]Post-op Diagnosis: SAMEProcedure(s):HYSTERECTOMY, TOTAL, VAGINALCOLPORRHAPHY Number of specimens: 1Name of specimens: 1. UTERUS AND CERVIX     Start Status   02/21/19 1017 Collected (02/21/19 0909)       02/21/19 1017

## 2019-02-21 NOTE — INTERVAL H&P NOTE
The patient has been examined and the H&P has been reviewed:    I concur with the findings and no changes have occurred since H&P was written.    Anesthesia/Surgery risks, benefits and alternative options discussed and understood by patient/family.          Active Hospital Problems    Diagnosis  POA    Cystocele with rectocele [N81.10, N81.6]  Yes      Resolved Hospital Problems   No resolved problems to display.

## 2019-02-21 NOTE — OP NOTE
DATE OF PROCEDURE:  02/21/2019.    SURGEON:  Rafael Guevara M.D.    ASSISTANT:  Saul Velasquez M.D.    ANESTHESIA:  General.    PREOPERATIVE DIAGNOSES:  Cystocele with rectocele, diffuse pelvic relaxation,   uterine prolapse, increasing residual urine.    POSTOPERATIVE DIAGNOSES:  Cystocele with rectocele, diffuse pelvic relaxation,   uterine prolapse, increasing residual urine.    PROCEDURES PERFORMED:  Vaginal hysterectomy with Rueda culdoplasty, anterior   and posterior colporrhaphy, sacrospinous ligament fixation, cystoscopy with   suprapubic catheterization.    COMPLICATIONS:  None.    BLOOD LOSS:  100 mL.    SPECIMENS:  Uterus and cervix.    PROCEDURE IN DETAIL:  Under general anesthesia, the patient was prepped and   [facility]  The cervix was grasped   with Cindi tenaculum.  Local was injected paracervically in the uterosacral   ligaments for postop pain management, the relaxation with dominant cystocele and   there was a significant separation between the bladder and the lower uterine   segment.  A transverse incision was made with a #15 blade.  Dissection was   carried apically with Metzenbaum scissors and the anterior cul-de-sac was   opened.  Uterine fundus was slipped through the incision.  The ovaries and tubes   appeared normal.  The fundus was small.  Single ligation technique Doderlein   hysterectomy was performed in the following order:  Uteroovarian ligaments,   uterine vessels, cardinal ligaments and uterosacral ligaments.  The cervix and   uterus were excised in toto and sent for histology.  The cul-de-sac was quite   deep, but no specific enterocele was noted.  Rueda culdoplasty was performed   with #1 Vicryl suture going through the cul-de-sac, incorporating the right   uterosacral ligament, the left uterosacral ligament and exiting the cul-de-sac.    Care was taken to avoid going lateral to the uterosacral ligaments to spare any   traction on the  ureters.  The peritoneum was pursestringed with 2-0 Vicryl   suture, a wedge of mucosa was excised anteriorly.  The cystocele was plicated in   layers with interrupted 2-0 Vicryl suture and the apex was attached to the   vault to give apical support.  Suburethral plication was performed.  The   incision was closed with Vicryl, but it was not completely closed until very end   of the procedure to avoid any accumulation of blood in that potential space.    Attention was then turned posteriorly and a strip of posterior mucosa was   excised and dissected off. The rectocele was dissected out.  There was fairly   good endopelvic fascia distally, but little to none apically, so it was decided   that a sacrospinous ligament fixation would be beneficial to give additional   support.  This was done with Capio device on either side attaching to the mid   portion of the sacrospinous ligament and attaching to the back of the vault.    When tied this did elevate and posterior angulate the vagina as desired.  Rectal   exam was confirmatory and B and O suppository was placed at the end of the   procedure and again at that time rectal exam was confirmatory.  After the   rectocele was plicated with 2-0 Vicryl suture, the mucosa was also closed with   2-0 Vicryl suture ending with a subcuticular stitch at the perineum.  There was   good anatomic result with good length and posterior angulation.  There was good   hemostasis at the end of the procedure.  Vaginal pack was inserted.  Cystoscopy   was performed.  There was a mild chronic cystitis.  Both ureters functioned   normally.  Mi catheter was inserted by trocar technique yielding clear   fluid.  It was sutured in place with nylon attached to a  bag.  All needle,   lap and instrument counts were correct.  The patient was awakened in the   Operating Room and sent to the Recovery in stable condition.      THELMA/IN  dd: 02/21/2019 10:47:22 (CST)  td: 02/21/2019 12:03:01 (CST)  Doc  ID   #[id number]  Job ID #[id number]    CC:

## 2019-02-21 NOTE — PLAN OF CARE
Patient is stable at this time.  VSS. Anesthesiologist at patient's bedside and is ok for patient to transfer to the floor. Sp Cath in place with no problems noted.  Pain is a 4/10.  No complaints of nausea or vomiting.  Patient tolerating po intake well.

## 2019-02-22 VITALS
DIASTOLIC BLOOD PRESSURE: 55 MMHG | WEIGHT: 141.75 LBS | OXYGEN SATURATION: 95 % | RESPIRATION RATE: 18 BRPM | TEMPERATURE: 97 F | HEIGHT: 65 IN | HEART RATE: 75 BPM | SYSTOLIC BLOOD PRESSURE: 110 MMHG | BODY MASS INDEX: 23.62 KG/M2

## 2019-02-22 PROCEDURE — 25000003 PHARM REV CODE 250: Performed by: OBSTETRICS & GYNECOLOGY

## 2019-02-22 RX ORDER — HYDROCODONE BITARTRATE AND ACETAMINOPHEN 5; 325 MG/1; MG/1
1 TABLET ORAL EVERY 6 HOURS PRN
Qty: 24 TABLET | Refills: 0 | Status: SHIPPED | OUTPATIENT
Start: 2019-02-22 | End: 2019-03-07

## 2019-02-22 RX ADMIN — OXYCODONE AND ACETAMINOPHEN 1 TABLET: 5; 325 TABLET ORAL at 03:02

## 2019-02-22 RX ADMIN — ROSUVASTATIN CALCIUM 10 MG: 5 TABLET, FILM COATED ORAL at 08:02

## 2019-02-22 RX ADMIN — STANDARDIZED SENNA CONCENTRATE AND DOCUSATE SODIUM 1 TABLET: 8.6; 5 TABLET, FILM COATED ORAL at 08:02

## 2019-02-22 RX ADMIN — RAMIPRIL 2.5 MG: 2.5 CAPSULE ORAL at 08:02

## 2019-02-22 RX ADMIN — OXYCODONE AND ACETAMINOPHEN 1 TABLET: 5; 325 TABLET ORAL at 08:02

## 2019-02-22 RX ADMIN — IBUPROFEN 600 MG: 600 TABLET ORAL at 08:02

## 2019-02-22 NOTE — PLAN OF CARE
02/22/19 1558   Final Note   Assessment Type Final Discharge Note   Anticipated Discharge Disposition Home

## 2019-02-22 NOTE — PROGRESS NOTES
02/21/19 1948   Patient Assessment/Suction   Level of Consciousness (AVPU) alert   Respiratory Effort Normal;Unlabored   PRE-TX-O2   O2 Device (Oxygen Therapy) room air   SpO2 95 %   Pulse 71

## 2019-02-22 NOTE — DISCHARGE SUMMARY
DATE OF DISCHARGE:  02/22/2019.    HOSPITAL COURSE:  This patient was admitted for surgical therapy for pelvic   relaxation and increased residual urine.  She underwent vaginal hysterectomy,   culdoplasty, sacrospinous fixation, anterior and posterior colporrhaphy and   cystoscopy and suprapubic catheterization.  She is doing well.  Pack has been   removed.  She is ambulating, tolerating her diet.  She has been instructed on   her care at home, which will include catheter care and when we hope to get the   catheter out, we do expect the likelihood of prolonged catheterization because   of her preop residuals.  She is to call should any problems arise.  Otherwise,   she will call in 3 days for potential catheter removal.  She will be discharged   on regular diet, decreased activities, stool softener and a pain pill if needed.      TREMAINE  dd: 02/22/2019 09:34:42 (CST)  td: 02/22/2019 10:03:29 (CST)  Doc ID   #[id number]  Job ID #[id number]    CC:

## 2019-02-22 NOTE — PLAN OF CARE
Problem: Adult Inpatient Plan of Care  Goal: Plan of Care Review  Outcome: Ongoing (interventions implemented as appropriate)  POC reviewed with pt, pt verbalized understanding. Safety maintained throughout shift, bed locked and in lowest position, call light in reach, Side rails up X 2. Pt on bed rest. Pt remained free of fall/trauma. Pt denies pain since arrival to the floor. VSS, pt remained afebrile this shift. Pt tolerating meals well, no reports of nausea and vomiting. IVF infused as ordered. Suprapubic catheter in place, draining clear yellow urine. Vaginal packing in place,packing to be removed at 7am. Will continue to monitor.

## 2019-02-22 NOTE — PLAN OF CARE
Problem: Adult Inpatient Plan of Care  Goal: Plan of Care Review  Outcome: Ongoing (interventions implemented as appropriate)  Admin. Pt. PO pain pill for moderate pain with good results.  Good urine o/p via f/c.  Discussed home life -pt. Performs vigorous physical work daily  Along with complete care of her  who has had recent CVA.  IVF infusing as ordered w/o problem.  Pt. Resting between care quietly with eyes closed.

## 2019-02-25 ENCOUNTER — TELEPHONE (OUTPATIENT)
Dept: UROGYNECOLOGY | Facility: CLINIC | Age: 74
End: 2019-02-25

## 2019-02-25 NOTE — TELEPHONE ENCOUNTER
----- Message from Eleonora Nuno sent at 2/25/2019  9:28 AM CST -----  Contact: self  Patient need to speak to nurse regarding calling with her urine output       Patient states she rather tell nurse output due to patient has questions       Please call to advice [phone]

## 2019-02-25 NOTE — TELEPHONE ENCOUNTER
Spoke with pt. She is calling to report her residual, which is 600ml she was instructed to let us know when her residual was 100ml or less

## 2019-02-27 ENCOUNTER — TELEPHONE (OUTPATIENT)
Dept: UROGYNECOLOGY | Facility: CLINIC | Age: 74
End: 2019-02-27

## 2019-02-27 NOTE — TELEPHONE ENCOUNTER
----- Message from Anjel Jackson sent at 2/27/2019 11:00 AM CST -----  Contact: Patient  Advised needs to speak with the nurse to report her bladder is not emptying as it should.  Please call [phone] or [phone]

## 2019-02-27 NOTE — TELEPHONE ENCOUNTER
Called and spoke to pt and she states that she urine is draining past the 2 clamps on her catheter. Wanted to know how to clamp it off, instructed on clamping the catheter and  Instructed that it will take some time for her to urinate on her own due to swelling from the surgery.states understanding and call was ended.

## 2019-03-07 ENCOUNTER — OFFICE VISIT (OUTPATIENT)
Dept: UROGYNECOLOGY | Facility: CLINIC | Age: 74
End: 2019-03-07
Payer: MEDICARE

## 2019-03-07 VITALS
WEIGHT: 148.38 LBS | HEART RATE: 68 BPM | BODY MASS INDEX: 24.72 KG/M2 | SYSTOLIC BLOOD PRESSURE: 164 MMHG | HEIGHT: 65 IN | DIASTOLIC BLOOD PRESSURE: 87 MMHG

## 2019-03-07 DIAGNOSIS — R39.198 INCREASING RESIDUAL URINE: ICD-10-CM

## 2019-03-07 DIAGNOSIS — N81.11 CYSTOCELE, MIDLINE: ICD-10-CM

## 2019-03-07 DIAGNOSIS — N81.4 UTERINE PROLAPSE: ICD-10-CM

## 2019-03-07 DIAGNOSIS — R35.0 URINARY FREQUENCY: Primary | ICD-10-CM

## 2019-03-07 DIAGNOSIS — N81.6 RECTOCELE: ICD-10-CM

## 2019-03-07 LAB
BILIRUB SERPL-MCNC: ABNORMAL MG/DL
BLOOD URINE, POC: 250
COLOR, POC UA: ABNORMAL
GLUCOSE UR QL STRIP: ABNORMAL
KETONES UR QL STRIP: ABNORMAL
LEUKOCYTE ESTERASE URINE, POC: ABNORMAL
NITRITE, POC UA: ABNORMAL
PH, POC UA: 6
PROTEIN, POC: ABNORMAL
SPECIFIC GRAVITY, POC UA: 1.01
UROBILINOGEN, POC UA: ABNORMAL

## 2019-03-07 PROCEDURE — 99213 OFFICE O/P EST LOW 20 MIN: CPT | Mod: PBBFAC,PO | Performed by: NURSE PRACTITIONER

## 2019-03-07 PROCEDURE — 99999 PR PBB SHADOW E&M-EST. PATIENT-LVL III: CPT | Mod: PBBFAC,,, | Performed by: NURSE PRACTITIONER

## 2019-03-07 PROCEDURE — 99999 PR PBB SHADOW E&M-EST. PATIENT-LVL III: ICD-10-PCS | Mod: PBBFAC,,, | Performed by: NURSE PRACTITIONER

## 2019-03-07 PROCEDURE — 99024 POSTOP FOLLOW-UP VISIT: CPT | Mod: POP,,, | Performed by: NURSE PRACTITIONER

## 2019-03-07 PROCEDURE — 81002 URINALYSIS NONAUTO W/O SCOPE: CPT | Mod: PBBFAC,PO | Performed by: NURSE PRACTITIONER

## 2019-03-07 PROCEDURE — 99024 PR POST-OP FOLLOW-UP VISIT: ICD-10-PCS | Mod: POP,,, | Performed by: NURSE PRACTITIONER

## 2019-03-07 NOTE — PROGRESS NOTES
Subjective:       Patient ID: Yesy Knight is a 73 y.o. female.    Chief Complaint: 2 week post op    HPI  Yesy Knight is a 73 y.o. female who is approx. 2 weeks post op from Vaginal hysterectomy with Rueda culdoplasty, anterior and posterior colporrhaphy, sacrospinous ligament fixation, cystoscopy with suprapubic catheterization on 02/21/19 with Dr. Guevara.  She feels that she is doing fairly well.  She denies any pain.  She denies any real vaginal discharge or bleeding.  She denies any real incontinence at this time.  She is going to the restroom about every 2-3 hours.  She is checking her residuals twice a day and they are fairly consistently running around 400 mls at this time.  She has been having BM without straining.  She was supposed to learn self catheterization before surgery, but she did not think she could do this.  When discussed if she wanted to learn this today in the hopes of getting rid of the suprapubic catheter earlier, she again stated that she did not wish to learn self cath and she will deal with the suprapubic catheter.  She denies any other acute complaints/concerns at this time.      Review of Systems   Constitutional: Negative for activity change, fever and unexpected weight change.   HENT: Negative for hearing loss.    Eyes: Negative for visual disturbance.   Respiratory: Negative for shortness of breath and wheezing.    Cardiovascular: Negative for chest pain, palpitations and leg swelling.   Gastrointestinal: Negative for abdominal pain, constipation and diarrhea.   Genitourinary: Positive for frequency. Negative for dyspareunia, dysuria, urgency, vaginal bleeding and vaginal discharge.   Musculoskeletal: Negative for gait problem and neck pain.   Skin: Negative for rash and wound.   Allergic/Immunologic: Negative for immunocompromised state.   Neurological: Negative for tremors, speech difficulty and weakness.   Hematological: Does not bruise/bleed easily.    Psychiatric/Behavioral: Negative for agitation and confusion.       Objective:      Physical Exam   Constitutional: She is oriented to person, place, and time. She appears well-developed and well-nourished. No distress.   HENT:   Head: Normocephalic and atraumatic.   Neck: Neck supple. No thyromegaly present.   Pulmonary/Chest: Effort normal. No respiratory distress.   Abdominal: Soft. There is no tenderness. No hernia.   Musculoskeletal: Normal range of motion.   Neurological: She is alert and oriented to person, place, and time.   Skin: Skin is warm and dry. No rash noted.        Suprapubic site is clean dry,intact   Psychiatric: She has a normal mood and affect. Her behavior is normal.     Pelvic Exam:  Deferred, post op    Assessment:       1. Urinary frequency    2. Cystocele, midline    3. Rectocele    4. Uterine prolapse    5. Increasing residual urine        Plan:       Urinary frequency- Np reviewed post op restrictions/limitations with pt.  Pt verbalized understanding.  NP also instructed that pt can decrease checking her residual to daily.  Her residual in the office today was 500 ml  -     POCT urine dipstick without microscope    Cystocele, midline- surgically corrected as noted above    Rectocele- as noted above    Uterine prolapse- as noted above    Increasing residual urine- as noted above    RTC 2 weeks.

## 2019-03-21 ENCOUNTER — OFFICE VISIT (OUTPATIENT)
Dept: UROGYNECOLOGY | Facility: CLINIC | Age: 74
End: 2019-03-21
Payer: MEDICARE

## 2019-03-21 VITALS
SYSTOLIC BLOOD PRESSURE: 144 MMHG | DIASTOLIC BLOOD PRESSURE: 83 MMHG | HEIGHT: 65 IN | BODY MASS INDEX: 24.87 KG/M2 | HEART RATE: 75 BPM | WEIGHT: 149.25 LBS

## 2019-03-21 DIAGNOSIS — N81.11 CYSTOCELE, MIDLINE: ICD-10-CM

## 2019-03-21 DIAGNOSIS — N81.4 UTERINE PROLAPSE: ICD-10-CM

## 2019-03-21 DIAGNOSIS — N81.6 RECTOCELE: ICD-10-CM

## 2019-03-21 DIAGNOSIS — R35.0 URINARY FREQUENCY: Primary | ICD-10-CM

## 2019-03-21 DIAGNOSIS — R39.198 INCREASING RESIDUAL URINE: ICD-10-CM

## 2019-03-21 LAB
BILIRUB SERPL-MCNC: ABNORMAL MG/DL
BLOOD URINE, POC: 250
COLOR, POC UA: YELLOW
GLUCOSE UR QL STRIP: ABNORMAL
KETONES UR QL STRIP: ABNORMAL
LEUKOCYTE ESTERASE URINE, POC: ABNORMAL
NITRITE, POC UA: ABNORMAL
PH, POC UA: 8
PROTEIN, POC: ABNORMAL
SPECIFIC GRAVITY, POC UA: 1
UROBILINOGEN, POC UA: ABNORMAL

## 2019-03-21 PROCEDURE — 99024 POSTOP FOLLOW-UP VISIT: CPT | Mod: POP,,, | Performed by: NURSE PRACTITIONER

## 2019-03-21 PROCEDURE — 99024 PR POST-OP FOLLOW-UP VISIT: ICD-10-PCS | Mod: POP,,, | Performed by: NURSE PRACTITIONER

## 2019-03-21 PROCEDURE — 99999 PR PBB SHADOW E&M-EST. PATIENT-LVL III: ICD-10-PCS | Mod: PBBFAC,,, | Performed by: NURSE PRACTITIONER

## 2019-03-21 PROCEDURE — 99999 PR PBB SHADOW E&M-EST. PATIENT-LVL III: CPT | Mod: PBBFAC,,, | Performed by: NURSE PRACTITIONER

## 2019-03-21 PROCEDURE — 81002 URINALYSIS NONAUTO W/O SCOPE: CPT | Mod: PBBFAC,PO | Performed by: NURSE PRACTITIONER

## 2019-03-21 PROCEDURE — 99213 OFFICE O/P EST LOW 20 MIN: CPT | Mod: PBBFAC,PO | Performed by: NURSE PRACTITIONER

## 2019-03-21 RX ORDER — AMOXICILLIN 500 MG
1 CAPSULE ORAL DAILY
COMMUNITY
End: 2021-06-21 | Stop reason: ALTCHOICE

## 2019-03-21 NOTE — PROGRESS NOTES
Subjective:       Patient ID: Yesy Knight is a 73 y.o. female.    Chief Complaint: 4 week post op    HPI  Yesy Knight is a 73 y.o. female who is approx. 4 weeks post op from Vaginal hysterectomy with Rueda culdoplasty, anterior and posterior colporrhaphy, sacrospinous ligament fixation, cystoscopy with suprapubic catheterization on 02/21/19 with Dr. Guevara.  She feels that for the most part she is doing well.  She denies any pain or any vaginal discharge at this time.  She has the suprapubic catheter.  She has been keeping it clamped and checking residuals once a day.  Her residuals are running around 300-400 ml.  She does feel that she is emptying her bladder fairly well.  She has frequency about 1.5-2 hours during the day.      Review of Systems   Constitutional: Negative for activity change, fever and unexpected weight change.   HENT: Negative for hearing loss.    Eyes: Negative for visual disturbance.   Respiratory: Negative for shortness of breath and wheezing.    Cardiovascular: Negative for chest pain, palpitations and leg swelling.   Gastrointestinal: Negative for abdominal pain, constipation and diarrhea.   Genitourinary: Positive for frequency. Negative for dyspareunia, dysuria, urgency, vaginal bleeding and vaginal discharge.   Musculoskeletal: Negative for gait problem and neck pain.   Skin: Negative for rash and wound.   Allergic/Immunologic: Negative for immunocompromised state.   Neurological: Negative for tremors, speech difficulty and weakness.   Hematological: Does not bruise/bleed easily.   Psychiatric/Behavioral: Negative for agitation and confusion.       Objective:      Physical Exam   Constitutional: She is oriented to person, place, and time. She appears well-developed and well-nourished. No distress.   HENT:   Head: Normocephalic and atraumatic.   Neck: Neck supple. No thyromegaly present.   Pulmonary/Chest: Effort normal. No respiratory distress.   Abdominal: Soft. There is no  tenderness. No hernia.   Musculoskeletal: Normal range of motion.   Neurological: She is alert and oriented to person, place, and time.   Skin: Skin is warm and dry. No rash noted.   Psychiatric: She has a normal mood and affect. Her behavior is normal.         Assessment:       1. Urinary frequency    2. Cystocele, midline    3. Rectocele    4. Uterine prolapse    5. Increasing residual urine          Procedure note-  NP drained bag and pt had approx. 275 ml residual.  NP contacted Dr. Guevara and reviewed results.  We will disconnect the catheter and clamp the end.  She will stop checking residuals and leave the catheter clamped unless she is having difficulty voiding.  She will return Monday for evaluation and possible removal of suprapubic catheter.    Plan:       Urinary frequency- monitor  -     POCT URINE DIPSTICK WITHOUT MICROSCOPE    Cystocele, midline- surgically repaired.  NP reviewed post op limitations/restrictions with pt.  Pt verbalized understanding.    Rectocele- as noted above    Uterine prolapse- as noted above    Increasing residual urine- as noted in above    RTC 4 days

## 2019-03-25 ENCOUNTER — CLINICAL SUPPORT (OUTPATIENT)
Dept: UROGYNECOLOGY | Facility: CLINIC | Age: 74
End: 2019-03-25
Payer: MEDICARE

## 2019-03-25 VITALS
DIASTOLIC BLOOD PRESSURE: 74 MMHG | HEIGHT: 65 IN | BODY MASS INDEX: 24.61 KG/M2 | WEIGHT: 147.69 LBS | HEART RATE: 73 BPM | SYSTOLIC BLOOD PRESSURE: 125 MMHG

## 2019-03-25 DIAGNOSIS — Z09 POSTOP CHECK: ICD-10-CM

## 2019-03-25 DIAGNOSIS — R35.0 URINARY FREQUENCY: Primary | ICD-10-CM

## 2019-03-25 LAB
BILIRUB SERPL-MCNC: ABNORMAL MG/DL
BLOOD URINE, POC: 250
COLOR, POC UA: ABNORMAL
GLUCOSE UR QL STRIP: ABNORMAL
KETONES UR QL STRIP: ABNORMAL
LEUKOCYTE ESTERASE URINE, POC: ABNORMAL
NITRITE, POC UA: ABNORMAL
PH, POC UA: 7
PROTEIN, POC: ABNORMAL
SPECIFIC GRAVITY, POC UA: 1
UROBILINOGEN, POC UA: ABNORMAL

## 2019-03-25 PROCEDURE — 81002 URINALYSIS NONAUTO W/O SCOPE: CPT | Mod: PBBFAC,PO | Performed by: OBSTETRICS & GYNECOLOGY

## 2019-03-25 PROCEDURE — 99999 PR PBB SHADOW E&M-EST. PATIENT-LVL III: CPT | Mod: PBBFAC,,, | Performed by: OBSTETRICS & GYNECOLOGY

## 2019-03-25 PROCEDURE — 99999 PR PBB SHADOW E&M-EST. PATIENT-LVL III: ICD-10-PCS | Mod: PBBFAC,,, | Performed by: OBSTETRICS & GYNECOLOGY

## 2019-03-25 PROCEDURE — 99024 PR POST-OP FOLLOW-UP VISIT: ICD-10-PCS | Mod: POP,,, | Performed by: OBSTETRICS & GYNECOLOGY

## 2019-03-25 PROCEDURE — 99213 OFFICE O/P EST LOW 20 MIN: CPT | Mod: PBBFAC,PO | Performed by: OBSTETRICS & GYNECOLOGY

## 2019-03-25 PROCEDURE — 99024 POSTOP FOLLOW-UP VISIT: CPT | Mod: POP,,, | Performed by: OBSTETRICS & GYNECOLOGY

## 2019-03-25 NOTE — PROGRESS NOTES
Subjective:     Chief Complaint:  Catheter removal  History of Present Illness: Yesy Knight is a 74 y.o. female who presents for postop suprapubic catheter removal.  She had elevated residuals preop.  The catheter is been clamped over the weekend and she is voiding without significant problems             Assessment:    Residual is about 100 mL       Plan:    Catheter removed without difficulty

## 2019-04-01 ENCOUNTER — OFFICE VISIT (OUTPATIENT)
Dept: UROGYNECOLOGY | Facility: CLINIC | Age: 74
End: 2019-04-01
Payer: MEDICARE

## 2019-04-01 VITALS
HEIGHT: 65 IN | WEIGHT: 147.5 LBS | DIASTOLIC BLOOD PRESSURE: 84 MMHG | BODY MASS INDEX: 24.57 KG/M2 | SYSTOLIC BLOOD PRESSURE: 143 MMHG | HEART RATE: 70 BPM

## 2019-04-01 DIAGNOSIS — Z09 POSTOP CHECK: ICD-10-CM

## 2019-04-01 DIAGNOSIS — R35.0 URINARY FREQUENCY: Primary | ICD-10-CM

## 2019-04-01 PROCEDURE — 99999 PR PBB SHADOW E&M-EST. PATIENT-LVL III: CPT | Mod: PBBFAC,,, | Performed by: OBSTETRICS & GYNECOLOGY

## 2019-04-01 PROCEDURE — 99213 OFFICE O/P EST LOW 20 MIN: CPT | Mod: PBBFAC,PO | Performed by: OBSTETRICS & GYNECOLOGY

## 2019-04-01 PROCEDURE — 99999 PR PBB SHADOW E&M-EST. PATIENT-LVL III: ICD-10-PCS | Mod: PBBFAC,,, | Performed by: OBSTETRICS & GYNECOLOGY

## 2019-04-01 PROCEDURE — 99024 POSTOP FOLLOW-UP VISIT: CPT | Mod: POP,,, | Performed by: OBSTETRICS & GYNECOLOGY

## 2019-04-01 PROCEDURE — 99024 PR POST-OP FOLLOW-UP VISIT: ICD-10-PCS | Mod: POP,,, | Performed by: OBSTETRICS & GYNECOLOGY

## 2019-04-01 PROCEDURE — 81002 URINALYSIS NONAUTO W/O SCOPE: CPT | Mod: PBBFAC,PO | Performed by: OBSTETRICS & GYNECOLOGY

## 2019-04-01 NOTE — PROGRESS NOTES
Subjective:     Chief Complaint:  Postop  History of Present Illness: Yesy Knight is a 74 y.o. female who presents for 6 week postop visit from vaginal hysterectomy in colporrhaphy.  She had elevated residual preop so the postop catheter was maintained for extensive time to be sure that she did not go into retention.  She is voiding well without any significant problems.    Review of Systems      We discussed her activities going forward and what risk factors there are to recurrence including constipation straining and coughing     Objective:   Healing well with good length and support  Assessment:      Good anatomic in clinical result    Plan:        We discussed her activities going forward.  We will follow up in 3-4 months and then discharge her care back to Dr. Murdock at that time

## 2019-04-07 ENCOUNTER — NURSE TRIAGE (OUTPATIENT)
Dept: ADMINISTRATIVE | Facility: CLINIC | Age: 74
End: 2019-04-07

## 2019-04-07 NOTE — TELEPHONE ENCOUNTER
Reason for Disposition   [1] Fever > 101 F (38.3 C) AND [2] age > 60    Protocols used: FEVER-A-AH    101.3 fever last night @ 1900  Woke up at midnight soaked in sweat  Denies pain or viral symptoms  Took ibuprofen at 0700  Temp right ear now 100.8  Transferred to patient's MD, Dr. Guevara, who is on call

## 2019-04-12 ENCOUNTER — TELEPHONE (OUTPATIENT)
Dept: UROGYNECOLOGY | Facility: CLINIC | Age: 74
End: 2019-04-12

## 2019-04-12 NOTE — TELEPHONE ENCOUNTER
----- Message from Violet Mckay sent at 4/12/2019  2:18 PM CDT -----  Pt is calling to  Speak to   The  Nurse   Pt is  Running  Fever and   Has  Pain  On r  Side //   Calling to  Speak // pt  Did  Have  Blood  Wk at  [facility] // [phone]

## 2019-04-12 NOTE — TELEPHONE ENCOUNTER
Went to  on Monday did not do anything for her, seen DR Bain,  He did  Blood work and urine and have not heard anything yet, had called several times and  They did not call her back , informed that I had tried to call the office as well  And no answer. Offered appt on Monday with dr Guevara , patient states she will wait for DR Diaz to get the results . Advised I would let DR Guevara know she has  Pain in the back and  States temp is below 100.0 will take  The extra pain pills she left .

## 2019-04-15 ENCOUNTER — CLINICAL SUPPORT (OUTPATIENT)
Dept: UROGYNECOLOGY | Facility: CLINIC | Age: 74
End: 2019-04-15
Payer: MEDICARE

## 2019-04-15 ENCOUNTER — TELEPHONE (OUTPATIENT)
Dept: UROGYNECOLOGY | Facility: CLINIC | Age: 74
End: 2019-04-15

## 2019-04-15 DIAGNOSIS — R39.15 URGENCY OF URINATION: Primary | ICD-10-CM

## 2019-04-15 LAB
BILIRUB SERPL-MCNC: ABNORMAL MG/DL
BLOOD URINE, POC: 250
COLOR, POC UA: YELLOW
GLUCOSE UR QL STRIP: ABNORMAL
KETONES UR QL STRIP: ABNORMAL
LEUKOCYTE ESTERASE URINE, POC: ABNORMAL
NITRITE, POC UA: ABNORMAL
PH, POC UA: 6
PROTEIN, POC: ABNORMAL
SPECIFIC GRAVITY, POC UA: 1.01
UROBILINOGEN, POC UA: ABNORMAL

## 2019-04-15 PROCEDURE — 87088 URINE BACTERIA CULTURE: CPT

## 2019-04-15 PROCEDURE — 87086 URINE CULTURE/COLONY COUNT: CPT

## 2019-04-15 PROCEDURE — 81002 URINALYSIS NONAUTO W/O SCOPE: CPT | Mod: PBBFAC,PO

## 2019-04-15 RX ORDER — CEPHALEXIN 500 MG/1
500 CAPSULE ORAL 3 TIMES DAILY
Qty: 15 CAPSULE | Refills: 0 | Status: SHIPPED | OUTPATIENT
Start: 2019-04-15 | End: 2019-04-20

## 2019-04-15 NOTE — PROGRESS NOTES
Dropped off urine sample states that she is having urgency and pain , states that she has been running fever. Dr Bain had did labs, u/s and the urine she did with them was contaminated.  Sending urine for culture.

## 2019-04-15 NOTE — TELEPHONE ENCOUNTER
----- Message from Leonie Adam sent at 4/15/2019  1:14 PM CDT -----  Contact: self                                      attn Apolonia  Patient [phone] is returning call to Nurse Apolonia from this afternoon/please call

## 2019-04-15 NOTE — TELEPHONE ENCOUNTER
----- Message from Kelly Castro sent at 4/15/2019  8:55 AM CDT -----  Contact: self   Patient need to speak with a nurse regarding getting a urine sample done, please call back at [phone]

## 2019-04-17 LAB — BACTERIA UR CULT: NORMAL

## 2019-07-01 ENCOUNTER — OFFICE VISIT (OUTPATIENT)
Dept: UROGYNECOLOGY | Facility: CLINIC | Age: 74
End: 2019-07-01
Payer: MEDICARE

## 2019-07-01 VITALS
SYSTOLIC BLOOD PRESSURE: 152 MMHG | HEIGHT: 65 IN | BODY MASS INDEX: 24.79 KG/M2 | WEIGHT: 148.81 LBS | HEART RATE: 75 BPM | DIASTOLIC BLOOD PRESSURE: 87 MMHG

## 2019-07-01 DIAGNOSIS — R10.13 EPIGASTRIC PAIN: ICD-10-CM

## 2019-07-01 DIAGNOSIS — R35.0 URINARY FREQUENCY: ICD-10-CM

## 2019-07-01 DIAGNOSIS — R33.9 URINARY RETENTION WITH INCOMPLETE BLADDER EMPTYING: Primary | ICD-10-CM

## 2019-07-01 DIAGNOSIS — N95.2 ATROPHIC VAGINITIS: ICD-10-CM

## 2019-07-01 LAB
BILIRUB SERPL-MCNC: ABNORMAL MG/DL
BLOOD URINE, POC: 50
COLOR, POC UA: YELLOW
GLUCOSE UR QL STRIP: ABNORMAL
KETONES UR QL STRIP: ABNORMAL
LEUKOCYTE ESTERASE URINE, POC: ABNORMAL
NITRITE, POC UA: ABNORMAL
PH, POC UA: 5
POC RESIDUAL URINE VOLUME: 165 ML (ref 0–100)
PROTEIN, POC: ABNORMAL
SPECIFIC GRAVITY, POC UA: 1.02
UROBILINOGEN, POC UA: ABNORMAL

## 2019-07-01 PROCEDURE — 99213 OFFICE O/P EST LOW 20 MIN: CPT | Mod: S$PBB,,, | Performed by: OBSTETRICS & GYNECOLOGY

## 2019-07-01 PROCEDURE — 81002 URINALYSIS NONAUTO W/O SCOPE: CPT | Mod: PBBFAC,PO | Performed by: OBSTETRICS & GYNECOLOGY

## 2019-07-01 PROCEDURE — 99999 PR PBB SHADOW E&M-EST. PATIENT-LVL III: CPT | Mod: PBBFAC,,, | Performed by: OBSTETRICS & GYNECOLOGY

## 2019-07-01 PROCEDURE — 51798 US URINE CAPACITY MEASURE: CPT | Mod: PBBFAC,PO | Performed by: OBSTETRICS & GYNECOLOGY

## 2019-07-01 PROCEDURE — 99213 OFFICE O/P EST LOW 20 MIN: CPT | Mod: PBBFAC,PO,25 | Performed by: OBSTETRICS & GYNECOLOGY

## 2019-07-01 PROCEDURE — 99999 PR PBB SHADOW E&M-EST. PATIENT-LVL III: ICD-10-PCS | Mod: PBBFAC,,, | Performed by: OBSTETRICS & GYNECOLOGY

## 2019-07-01 PROCEDURE — 99213 PR OFFICE/OUTPT VISIT, EST, LEVL III, 20-29 MIN: ICD-10-PCS | Mod: S$PBB,,, | Performed by: OBSTETRICS & GYNECOLOGY

## 2019-07-01 RX ORDER — ESTRADIOL 0.1 MG/G
1 CREAM VAGINAL DAILY
Qty: 42.5 G | Refills: 1 | Status: SHIPPED | OUTPATIENT
Start: 2019-07-01 | End: 2021-11-29

## 2019-07-01 NOTE — PROGRESS NOTES
Subjective:     Chief Complaint:  Elevated residual urine  History of Present Illness: Yesy Knight is a 74 y.o. female who presents for follow-up of preop elevated residual.  She was initially seen for uterine prolapse and pelvic relaxation.  On cystometry she was found have residual 450 mL and bladder capacity of greater than 750.  She underwent vaginal hysterectomy with colporrhaphy.  Gradually over several weeks her residual reduced to 100 mL when the suprapubic catheter was removed.  She says she is voiding with no problems.  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  She is complaining of occasional upper abdominal pain in midline when she changes positions from sitting to standing.  It is not affected by diet or any triggers.  Does not always occur.  It is not very severe.  She had a hiatal hernia surgery past and was told she may have some scar tissue.    Review of Systems    Constitutional: No acute distress. No weight gain/loss.  Eyes: No vision changes.  ENT: No headaches.   Respiratory: Non smoker  Cardiovascular:  Hyperlipidemia, Hypertension  Gastrointestinal:  GERD  Genitourinary: No vaginal bleeding or discharge.  Integument/Breast: Negative  Hematologic/Lymphatic:  Has been told she is mildly anemic  Musculoskeletal: No major back pain. No abdominal pain.  Neurological: No known disc problems. No paresthesias.  Behavioral/Psych: No history of depression.   Endocrine: No hormonal replacement.  Allergy/Immune: no recent reactions     Objective:   General Exam:  General appearance: WDNF. NAD.   HEENT: Katie. EOM's intact.  Neck: Normal thyroid.   Back: No CVA tenderness.  RESP: No SOB.  Breasts: deferred  Abdomen: Benign without localizing signs.  Extremities: No edema. No varices.  Lymphatic: noncontributory  Skin: No rashes.  No lesions.  Neurologic: Intact.   Psych: Oriented.   Pelvic Exam:  V:  No lesions. No palpable nodes.   Va:No d/c bleeding or lesions.  Good length and support.  Well healed but there is mild atrophy  .Meatus:No caruncle or stenosis  Urethra: Non tender. No suburethral masses.  Cx/Cuff: Normal   Uterus: Surgically absent.  Ad: No mass or tenderness.  Levators :Symmetrical. Normal tone. Non tender.  BL: Non tender  RV: No hemorrhoids.  Assessment:   Good anatomic result from surgery-although the residual still somewhat elevated, it is much reduced from what it was preop at 450 mL-   Epigastric abdominal wall surgery    Procedure note; bladder scan; residual was 165-this may be falsely elevated since she has more difficulty voiding in the office than at home  Plan:    If the epigastric discomfort is bothersome or worsens she will follow-up with primary care or GI  Follow-up with  for her routine GYN care and return here p.r.n.  Low-dose topical estrogen cream every other night

## 2019-11-14 DIAGNOSIS — Z12.31 VISIT FOR SCREENING MAMMOGRAM: Primary | ICD-10-CM

## 2019-11-20 ENCOUNTER — HOSPITAL ENCOUNTER (OUTPATIENT)
Dept: RADIOLOGY | Facility: HOSPITAL | Age: 74
Discharge: HOME | End: 2019-11-20
Attending: SPECIALIST
Payer: MEDICARE

## 2019-11-20 VITALS — HEIGHT: 65 IN | BODY MASS INDEX: 24.79 KG/M2 | WEIGHT: 148.81 LBS

## 2019-11-20 DIAGNOSIS — Z12.31 VISIT FOR SCREENING MAMMOGRAM: ICD-10-CM

## 2019-11-20 PROCEDURE — 77063 BREAST TOMOSYNTHESIS BI: CPT | Mod: TC,PO

## 2019-11-20 PROCEDURE — 77067 SCR MAMMO BI INCL CAD: CPT | Mod: TC,PO

## 2019-11-25 ENCOUNTER — LAB VISIT (OUTPATIENT)
Dept: LAB | Facility: HOSPITAL | Age: 74
End: 2019-11-25
Attending: INTERNAL MEDICINE
Payer: MEDICARE

## 2019-11-25 DIAGNOSIS — E78.5 HYPERLIPEMIA: ICD-10-CM

## 2019-11-25 DIAGNOSIS — I51.9 MYXEDEMA HEART DISEASE: Primary | ICD-10-CM

## 2019-11-25 DIAGNOSIS — I51.9 MYXEDEMA HEART DISEASE: ICD-10-CM

## 2019-11-25 DIAGNOSIS — I10 ESSENTIAL HYPERTENSION, MALIGNANT: ICD-10-CM

## 2019-11-25 DIAGNOSIS — E03.9 MYXEDEMA HEART DISEASE: ICD-10-CM

## 2019-11-25 DIAGNOSIS — E03.9 MYXEDEMA HEART DISEASE: Primary | ICD-10-CM

## 2019-11-25 LAB
ALBUMIN SERPL BCP-MCNC: 4.6 G/DL (ref 3.5–5.2)
ALP SERPL-CCNC: 56 U/L (ref 55–135)
ALT SERPL W/O P-5'-P-CCNC: 27 U/L (ref 10–44)
ANION GAP SERPL CALC-SCNC: 8 MMOL/L (ref 8–16)
AST SERPL-CCNC: 33 U/L (ref 10–40)
BASOPHILS # BLD AUTO: 0.04 K/UL (ref 0–0.2)
BASOPHILS NFR BLD: 0.7 % (ref 0–1.9)
BILIRUB SERPL-MCNC: 1.3 MG/DL (ref 0.1–1)
BUN SERPL-MCNC: 15 MG/DL (ref 8–23)
CALCIUM SERPL-MCNC: 9.5 MG/DL (ref 8.7–10.5)
CHLORIDE SERPL-SCNC: 103 MMOL/L (ref 95–110)
CO2 SERPL-SCNC: 27 MMOL/L (ref 23–29)
CREAT SERPL-MCNC: 0.6 MG/DL (ref 0.5–1.4)
DIFFERENTIAL METHOD: ABNORMAL
EOSINOPHIL # BLD AUTO: 0.1 K/UL (ref 0–0.5)
EOSINOPHIL NFR BLD: 1.5 % (ref 0–8)
ERYTHROCYTE [DISTWIDTH] IN BLOOD BY AUTOMATED COUNT: 12.1 % (ref 11.5–14.5)
EST. GFR  (AFRICAN AMERICAN): >60 ML/MIN/1.73 M^2
EST. GFR  (NON AFRICAN AMERICAN): >60 ML/MIN/1.73 M^2
GLUCOSE SERPL-MCNC: 104 MG/DL (ref 70–110)
HCT VFR BLD AUTO: 40.4 % (ref 37–48.5)
HGB BLD-MCNC: 13.8 G/DL (ref 12–16)
IMM GRANULOCYTES # BLD AUTO: 0.01 K/UL (ref 0–0.04)
IMM GRANULOCYTES NFR BLD AUTO: 0.2 % (ref 0–0.5)
LYMPHOCYTES # BLD AUTO: 2 K/UL (ref 1–4.8)
LYMPHOCYTES NFR BLD: 33.6 % (ref 18–48)
MCH RBC QN AUTO: 32.7 PG (ref 27–31)
MCHC RBC AUTO-ENTMCNC: 34.2 G/DL (ref 32–36)
MCV RBC AUTO: 96 FL (ref 82–98)
MONOCYTES # BLD AUTO: 0.4 K/UL (ref 0.3–1)
MONOCYTES NFR BLD: 6.5 % (ref 4–15)
NEUTROPHILS # BLD AUTO: 3.4 K/UL (ref 1.8–7.7)
NEUTROPHILS NFR BLD: 57.5 % (ref 38–73)
NRBC BLD-RTO: 0 /100 WBC
PLATELET # BLD AUTO: 224 K/UL (ref 150–350)
PMV BLD AUTO: 10.9 FL (ref 9.2–12.9)
POTASSIUM SERPL-SCNC: 4.4 MMOL/L (ref 3.5–5.1)
PROT SERPL-MCNC: 7.2 G/DL (ref 6–8.4)
RBC # BLD AUTO: 4.22 M/UL (ref 4–5.4)
SODIUM SERPL-SCNC: 138 MMOL/L (ref 136–145)
T4 FREE SERPL-MCNC: 0.64 NG/DL (ref 0.71–1.51)
TSH SERPL DL<=0.005 MIU/L-ACNC: 6.55 UIU/ML (ref 0.34–5.6)
WBC # BLD AUTO: 5.86 K/UL (ref 3.9–12.7)

## 2019-11-25 PROCEDURE — 85025 COMPLETE CBC W/AUTO DIFF WBC: CPT

## 2019-11-25 PROCEDURE — 80053 COMPREHEN METABOLIC PANEL: CPT

## 2019-11-25 PROCEDURE — 84439 ASSAY OF FREE THYROXINE: CPT

## 2019-11-25 PROCEDURE — 84443 ASSAY THYROID STIM HORMONE: CPT

## 2019-11-25 PROCEDURE — 36415 COLL VENOUS BLD VENIPUNCTURE: CPT

## 2020-07-06 ENCOUNTER — LAB VISIT (OUTPATIENT)
Dept: LAB | Facility: HOSPITAL | Age: 75
End: 2020-07-06
Attending: INTERNAL MEDICINE
Payer: MEDICARE

## 2020-07-06 DIAGNOSIS — I51.9 MYXEDEMA HEART DISEASE: Primary | ICD-10-CM

## 2020-07-06 DIAGNOSIS — E03.9 MYXEDEMA HEART DISEASE: Primary | ICD-10-CM

## 2020-07-06 LAB
T4 FREE SERPL-MCNC: 0.86 NG/DL (ref 0.71–1.51)
TSH SERPL DL<=0.005 MIU/L-ACNC: 3.41 UIU/ML (ref 0.34–5.6)

## 2020-07-06 PROCEDURE — 84443 ASSAY THYROID STIM HORMONE: CPT

## 2020-07-06 PROCEDURE — 84439 ASSAY OF FREE THYROXINE: CPT

## 2020-07-06 PROCEDURE — 36415 COLL VENOUS BLD VENIPUNCTURE: CPT

## 2020-07-06 PROCEDURE — 86376 MICROSOMAL ANTIBODY EACH: CPT

## 2020-07-07 LAB — THYROPEROXIDASE AB SERPL-ACNC: 98 IU/ML (ref 0–34)

## 2020-07-17 DIAGNOSIS — Z71.89 COMPLEX CARE COORDINATION: ICD-10-CM

## 2020-08-06 ENCOUNTER — LAB VISIT (OUTPATIENT)
Dept: LAB | Facility: HOSPITAL | Age: 75
End: 2020-08-06
Attending: INTERNAL MEDICINE
Payer: MEDICARE

## 2020-08-06 DIAGNOSIS — R00.2 PALPITATIONS: ICD-10-CM

## 2020-08-06 DIAGNOSIS — R55 SYNCOPE AND COLLAPSE: ICD-10-CM

## 2020-08-06 DIAGNOSIS — E03.9 MYXEDEMA HEART DISEASE: ICD-10-CM

## 2020-08-06 DIAGNOSIS — E78.5 HYPERLIPEMIA: ICD-10-CM

## 2020-08-06 DIAGNOSIS — I51.9 MYXEDEMA HEART DISEASE: ICD-10-CM

## 2020-08-06 DIAGNOSIS — E78.5 HYPERLIPEMIA: Primary | ICD-10-CM

## 2020-08-06 LAB
ALBUMIN SERPL BCP-MCNC: 4.1 G/DL (ref 3.5–5.2)
ALP SERPL-CCNC: 55 U/L (ref 55–135)
ALT SERPL W/O P-5'-P-CCNC: 26 U/L (ref 10–44)
ANION GAP SERPL CALC-SCNC: 7 MMOL/L (ref 8–16)
AST SERPL-CCNC: 24 U/L (ref 10–40)
BILIRUB SERPL-MCNC: 0.7 MG/DL (ref 0.1–1)
BUN SERPL-MCNC: 23 MG/DL (ref 8–23)
CALCIUM SERPL-MCNC: 9.6 MG/DL (ref 8.7–10.5)
CHLORIDE SERPL-SCNC: 105 MMOL/L (ref 95–110)
CHOLEST SERPL-MCNC: 156 MG/DL (ref 120–199)
CHOLEST/HDLC SERPL: 3 {RATIO} (ref 2–5)
CO2 SERPL-SCNC: 27 MMOL/L (ref 23–29)
CREAT SERPL-MCNC: 0.7 MG/DL (ref 0.5–1.4)
EST. GFR  (AFRICAN AMERICAN): >60 ML/MIN/1.73 M^2
EST. GFR  (NON AFRICAN AMERICAN): >60 ML/MIN/1.73 M^2
GLUCOSE SERPL-MCNC: 100 MG/DL (ref 70–110)
HDLC SERPL-MCNC: 52 MG/DL (ref 40–75)
HDLC SERPL: 33.3 % (ref 20–50)
LDLC SERPL CALC-MCNC: 84.4 MG/DL (ref 63–159)
NONHDLC SERPL-MCNC: 104 MG/DL
POTASSIUM SERPL-SCNC: 4.1 MMOL/L (ref 3.5–5.1)
PROT SERPL-MCNC: 6.6 G/DL (ref 6–8.4)
SODIUM SERPL-SCNC: 139 MMOL/L (ref 136–145)
TRIGL SERPL-MCNC: 98 MG/DL (ref 30–150)

## 2020-08-06 PROCEDURE — 80053 COMPREHEN METABOLIC PANEL: CPT

## 2020-08-06 PROCEDURE — 80061 LIPID PANEL: CPT

## 2020-08-06 PROCEDURE — 36415 COLL VENOUS BLD VENIPUNCTURE: CPT

## 2020-11-19 ENCOUNTER — LAB VISIT (OUTPATIENT)
Dept: LAB | Facility: HOSPITAL | Age: 75
End: 2020-11-19
Attending: INTERNAL MEDICINE
Payer: MEDICARE

## 2020-11-19 DIAGNOSIS — I51.9 MYXEDEMA HEART DISEASE: Primary | ICD-10-CM

## 2020-11-19 DIAGNOSIS — E03.9 MYXEDEMA HEART DISEASE: Primary | ICD-10-CM

## 2020-11-19 LAB
T4 FREE SERPL-MCNC: 0.89 NG/DL (ref 0.71–1.51)
TSH SERPL DL<=0.005 MIU/L-ACNC: 4.12 UIU/ML (ref 0.34–5.6)

## 2020-11-19 PROCEDURE — 84443 ASSAY THYROID STIM HORMONE: CPT

## 2020-11-19 PROCEDURE — 36415 COLL VENOUS BLD VENIPUNCTURE: CPT

## 2020-11-19 PROCEDURE — 84439 ASSAY OF FREE THYROXINE: CPT

## 2020-12-17 DIAGNOSIS — Z12.31 ENCOUNTER FOR SCREENING MAMMOGRAM FOR MALIGNANT NEOPLASM OF BREAST: Primary | ICD-10-CM

## 2020-12-31 ENCOUNTER — OFFICE VISIT (OUTPATIENT)
Dept: FAMILY MEDICINE | Facility: CLINIC | Age: 75
End: 2020-12-31
Payer: MEDICARE

## 2020-12-31 VITALS
DIASTOLIC BLOOD PRESSURE: 72 MMHG | BODY MASS INDEX: 24.74 KG/M2 | SYSTOLIC BLOOD PRESSURE: 148 MMHG | WEIGHT: 148.5 LBS | TEMPERATURE: 98 F | HEART RATE: 76 BPM | HEIGHT: 65 IN | OXYGEN SATURATION: 98 %

## 2020-12-31 DIAGNOSIS — R25.2 MUSCLE CRAMPS: Primary | ICD-10-CM

## 2020-12-31 DIAGNOSIS — Z23 NEED FOR INFLUENZA VACCINATION: ICD-10-CM

## 2020-12-31 DIAGNOSIS — I10 HYPERTENSION, UNSPECIFIED TYPE: ICD-10-CM

## 2020-12-31 DIAGNOSIS — Z23 NEED FOR PNEUMOCOCCAL VACCINE: ICD-10-CM

## 2020-12-31 DIAGNOSIS — Z11.59 ENCOUNTER FOR HEPATITIS C SCREENING TEST FOR LOW RISK PATIENT: ICD-10-CM

## 2020-12-31 DIAGNOSIS — E78.2 MIXED HYPERLIPIDEMIA: ICD-10-CM

## 2020-12-31 PROCEDURE — G0009 ADMIN PNEUMOCOCCAL VACCINE: HCPCS | Mod: PBBFAC | Performed by: NURSE PRACTITIONER

## 2020-12-31 PROCEDURE — 90662 IIV NO PRSV INCREASED AG IM: CPT | Mod: PBBFAC | Performed by: NURSE PRACTITIONER

## 2020-12-31 PROCEDURE — 99204 OFFICE O/P NEW MOD 45 MIN: CPT | Mod: S$PBB,,, | Performed by: NURSE PRACTITIONER

## 2020-12-31 PROCEDURE — 99204 PR OFFICE/OUTPT VISIT, NEW, LEVL IV, 45-59 MIN: ICD-10-PCS | Mod: S$PBB,,, | Performed by: NURSE PRACTITIONER

## 2020-12-31 PROCEDURE — 99215 OFFICE O/P EST HI 40 MIN: CPT | Performed by: NURSE PRACTITIONER

## 2020-12-31 RX ORDER — CALCIUM CARBONATE 300MG(750)
1 TABLET,CHEWABLE ORAL DAILY
COMMUNITY
Start: 2020-12-31 | End: 2021-11-29

## 2020-12-31 RX ORDER — LEVOTHYROXINE SODIUM 25 UG/1
TABLET ORAL
COMMUNITY
Start: 2020-11-23 | End: 2021-01-28

## 2020-12-31 NOTE — PROGRESS NOTES
SUBJECTIVE:      Patient ID: Yesy Knight is a 75 y.o. female.    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 Padilla has suggested hip & knee surgeries but she is concerned about help with her     Discussed outstanding health maintenance - refuses Dexa scan as she states she's tried bone meds in the past with poor side effects, amenable to immunizations today    Hypertension  This is a chronic problem. The current episode started more than 1 year ago. The problem is controlled. Pertinent negatives include no chest pain, neck pain, palpitations or shortness of breath. Agents associated with hypertension include thyroid hormones. Risk factors for coronary artery disease include dyslipidemia, post-menopausal state, sedentary lifestyle and stress. Past treatments include ACE inhibitors. The current treatment provides mild improvement.   Hyperlipidemia  This is a chronic problem. The current episode started more than 1 year ago. The problem is controlled. Recent lipid tests were reviewed and are normal. Exacerbating diseases include hypothyroidism. Factors aggravating her hyperlipidemia include fatty foods. Associated symptoms include myalgias. Pertinent negatives include no chest pain or shortness of breath. Current antihyperlipidemic treatment includes statins. The current treatment provides moderate improvement of lipids. Risk factors for coronary artery disease include dyslipidemia, hypertension, a sedentary lifestyle, post-menopausal and stress.   Spasms  This is a recurrent problem. The current episode started more than 1 month ago. The problem occurs intermittently. The problem has been waxing and waning. Associated symptoms include arthralgias and myalgias. Pertinent negatives include no abdominal  pain, chest pain, congestion, coughing, fatigue, joint swelling, nausea, neck pain, numbness, sore throat, vomiting or weakness. Exacerbated by: cramps worse at night when lying in bed. She has tried walking for the symptoms. The treatment provided mild relief.       Past Surgical History:   Procedure Laterality Date    COLPORRHAPHY N/A 2/21/2019    Procedure: COLPORRHAPHY;  Surgeon: Rafael Guevara MD;  Location: [facility] OR;  Service: OB/GYN;  Laterality: N/A;    CYSTOSCOPY N/A 2/21/2019    Procedure: CYSTOSCOPY;  Surgeon: Rafael Guevara MD;  Location: [facility] OR;  Service: OB/GYN;  Laterality: N/A;    MOUTH SURGERY  03/2018     second one 10/2018    SACROSPINOUS LIGAMENT FIXATION  2/21/2019    Procedure: FIXATION, LIGAMENT, SACROSPINOUS;  Surgeon: Rafael Guevara MD;  Location: [facility] OR;  Service: OB/GYN;;    STOMACH SURGERY      TOTAL VAGINAL HYSTERECTOMY N/A 2/21/2019    Procedure: HYSTERECTOMY, TOTAL, VAGINAL;  Surgeon: Rafael Guevara MD;  Location: [facility] OR;  Service: OB/GYN;  Laterality: N/A;     History reviewed. No pertinent family history.   Social History     Socioeconomic History    Marital status:      Spouse name: Not on file    Number of children: Not on file    Years of education: Not on file    Highest education level: Not on file   Occupational History    Not on file   Social Needs    Financial resource strain: Not on file    Food insecurity     Worry: Not on file     Inability: Not on file    Transportation needs     Medical: Not on file     Non-medical: Not on file   Tobacco Use    Smoking status: Never Smoker    Smokeless tobacco: Never Used   Substance and Sexual Activity    Alcohol use: Yes     Alcohol/week: 2.0 standard drinks     Types: 2 Glasses of wine per week    Drug use: No    Sexual activity: Not Currently   Lifestyle    Physical activity     Days per week: Not on file     Minutes per session: Not on file    Stress: Not on file   Relationships    Social  connections     Talks on phone: Not on file     Gets together: Not on file     Attends Gnosticist service: Not on file     Active member of club or organization: Not on file     Attends meetings of clubs or organizations: Not on file     Relationship status: Not on file   Other Topics Concern    Not on file   Social History Narrative    Not on file     Current Outpatient Medications   Medication Sig Dispense Refill    cholecalciferol, vitamin D3, (VITAMIN D3 ORAL) Take by mouth.      estradiol (ESTRACE) 0.01 % (0.1 mg/gram) vaginal cream Place 1 g vaginally once daily. 42.5 g 1    fish oil-omega-3 fatty acids 300-1,000 mg capsule Take 1 capsule by mouth once daily.      levothyroxine (SYNTHROID) 25 MCG tablet       ramipril (ALTACE) 2.5 MG capsule       CRESTOR 10 mg tablet TAKE 1 TABLET ONCE DAILY INTHE EVENING (Patient not taking: Reported on 12/31/2020) 90 tablet 3    magnesium oxide 400 mg magnesium Tab Take 1 tablet by mouth once daily.       No current facility-administered medications for this visit.      Review of patient's allergies indicates:  No Known Allergies   Past Medical History:   Diagnosis Date    Hypertension     Wears partial dentures     bottom partials     Past Surgical History:   Procedure Laterality Date    COLPORRHAPHY N/A 2/21/2019    Procedure: COLPORRHAPHY;  Surgeon: Rafael Guevara MD;  Location: [facility] OR;  Service: OB/GYN;  Laterality: N/A;    CYSTOSCOPY N/A 2/21/2019    Procedure: CYSTOSCOPY;  Surgeon: Rafael Guevara MD;  Location: [facility] OR;  Service: OB/GYN;  Laterality: N/A;    MOUTH SURGERY  03/2018     second one 10/2018    SACROSPINOUS LIGAMENT FIXATION  2/21/2019    Procedure: FIXATION, LIGAMENT, SACROSPINOUS;  Surgeon: Rafael Guevara MD;  Location: [facility] OR;  Service: OB/GYN;;    STOMACH SURGERY      TOTAL VAGINAL HYSTERECTOMY N/A 2/21/2019    Procedure: HYSTERECTOMY, TOTAL, VAGINAL;  Surgeon: Rafael Guevara MD;  Location: [facility] OR;  Service: OB/GYN;   "Laterality: N/A;       Review of Systems   Constitutional: Negative for activity change, appetite change, fatigue and unexpected weight change.   HENT: Negative for congestion, ear pain, hearing loss, postnasal drip, sinus pressure, sinus pain, sneezing and sore throat.    Eyes: Negative for photophobia and pain.   Respiratory: Negative for cough, chest tightness, shortness of breath and wheezing.    Cardiovascular: Negative for chest pain, palpitations and leg swelling.        Following with Dr. Mcginnis   Gastrointestinal: Negative for abdominal distention, abdominal pain, blood in stool, constipation, diarrhea, nausea and vomiting.   Endocrine: Negative for cold intolerance, heat intolerance, polydipsia and polyuria.        Following with Dr. Joseph   Genitourinary: Negative for difficulty urinating, dysuria, flank pain, frequency, hematuria, pelvic pain and urgency.   Musculoskeletal: Positive for arthralgias and myalgias. Negative for back pain, joint swelling and neck pain.        Following with Dr. Padilla   Skin: Negative for pallor.   Allergic/Immunologic: Negative for environmental allergies and food allergies.   Neurological: Negative for dizziness, weakness, light-headedness and numbness.   Hematological: Does not bruise/bleed easily.   Psychiatric/Behavioral: Negative for agitation, confusion, decreased concentration and sleep disturbance. The patient is not nervous/anxious.       OBJECTIVE:      Vitals:    12/31/20 1347 12/31/20 1430   BP: (!) (P) 154/70 (!) 148/72   BP Location: (P) Right arm Right arm   Patient Position: (P) Sitting Sitting   BP Method: (P) Medium (Manual) Medium (Manual)   Pulse: 76    Resp: (P) 20    Temp: 97.9 °F (36.6 °C)    TempSrc: Oral    SpO2: 98%    Weight: 67.4 kg (148 lb 8 oz)    Height: 5' 5" (1.651 m)      Physical Exam  Vitals signs and nursing note reviewed.   Constitutional:       General: She is not in acute distress.     Appearance: Normal appearance. She is " well-developed and normal weight.   HENT:      Head: Normocephalic and atraumatic.      Right Ear: Hearing normal.      Left Ear: Hearing normal.      Nose: Nose normal. No rhinorrhea.   Eyes:      General: Lids are normal.         Right eye: No discharge.         Left eye: No discharge.      Conjunctiva/sclera: Conjunctivae normal.      Right eye: Right conjunctiva is not injected.      Left eye: Left conjunctiva is not injected.      Pupils: Pupils are equal, round, and reactive to light. Pupils are equal.      Right eye: Pupil is round and reactive.      Left eye: Pupil is round and reactive.   Neck:      Musculoskeletal: Normal range of motion and neck supple.      Thyroid: No thyromegaly.      Vascular: No JVD.      Trachea: Trachea normal. No tracheal deviation.   Cardiovascular:      Rate and Rhythm: Normal rate and regular rhythm.      Pulses:           Radial pulses are 2+ on the right side and 2+ on the left side.      Heart sounds: Normal heart sounds. No murmur. No friction rub. No gallop.    Pulmonary:      Effort: Pulmonary effort is normal. No respiratory distress.      Breath sounds: Normal breath sounds. No stridor. No decreased breath sounds, wheezing, rhonchi or rales.   Abdominal:      General: Bowel sounds are normal. There is no distension.      Palpations: Abdomen is soft. Abdomen is not rigid.      Tenderness: There is no abdominal tenderness. There is no guarding.   Musculoskeletal: Normal range of motion.   Lymphadenopathy:      Cervical: No cervical adenopathy.   Skin:     General: Skin is warm and dry.      Capillary Refill: Capillary refill takes less than 2 seconds.      Coloration: Skin is not pale.      Findings: No lesion or rash.   Neurological:      Mental Status: She is alert and oriented to person, place, and time.      Motor: No atrophy.      Coordination: Coordination normal.      Gait: Gait normal.   Psychiatric:         Attention and Perception: She is attentive.          Speech: Speech normal.         Behavior: Behavior normal.         Thought Content: Thought content normal.         Judgment: Judgment normal.        Assessment:       1. Muscle cramps    2. Hypertension, unspecified type    3. Mixed hyperlipidemia    4. Need for influenza vaccination    5. Need for pneumococcal vaccine    6. Encounter for hepatitis C screening test for low risk patient        Plan:       Muscle cramps  -     magnesium oxide 400 mg magnesium Tab; Take 1 tablet by mouth once daily.  - encouraged to start back eating foods high in potassium, as she states she stopped when on Unithroid since it caused diarrhea    Hypertension, unspecified type  -     CBC Auto Differential; Future; Expected date: 01/07/2021  -     Comprehensive Metabolic Panel; Future; Expected date: 12/31/2020  -     Magnesium; Future; Expected date: 12/31/2020  -     Lipid Panel; Future; Expected date: 03/31/2021  - states her home SBP runs 90s-110s & readings are often high at healthcare appointments    Mixed hyperlipidemia  -     Lipid Panel; Future; Expected date: 03/31/2021    Need for influenza vaccination  -     Influenza - Quadrivalent - High Dose (65+) (PF) (IM)    Need for pneumococcal vaccine  -     Pneumococcal Conjugate Vaccine (13 Valent) (IM)    Encounter for hepatitis C screening test for low risk patient  -     Hepatitis C Antibody; Future; Expected date: 12/31/2020        Follow up in about 1 year (around 12/31/2021) for HTN recheck.      12/31/2020 ANALI Moran, FNP-C

## 2020-12-31 NOTE — PATIENT INSTRUCTIONS
Bananas, oranges, cantaloupe, honeydew, apricots, grapefruit (some dried fruits, such as prunes, raisins, and dates, are also high in potassium)  Cooked spinach  Cooked broccoli  Potatoes  Sweet potatoes  Mushrooms  Peas  Cucumbers  Zucchini  Eggplant  Pumpkins  Tuna  Halibut  Cod  Trout  Lima beans  Lemus beans  Kidney beans  Soybeans  Lentils  Molasses  Nuts  Meat and poultry  Brown and wild rice  Bran cereal  Whole-wheat bread and pasta

## 2021-01-05 ENCOUNTER — HOSPITAL ENCOUNTER (OUTPATIENT)
Dept: RADIOLOGY | Facility: HOSPITAL | Age: 76
Discharge: HOME | End: 2021-01-05
Attending: SPECIALIST
Payer: MEDICARE

## 2021-01-05 VITALS — BODY MASS INDEX: 24.75 KG/M2 | WEIGHT: 148.56 LBS | HEIGHT: 65 IN

## 2021-01-05 DIAGNOSIS — Z12.31 ENCOUNTER FOR SCREENING MAMMOGRAM FOR MALIGNANT NEOPLASM OF BREAST: ICD-10-CM

## 2021-01-05 PROCEDURE — 77067 SCR MAMMO BI INCL CAD: CPT | Mod: TC,PO

## 2021-01-15 ENCOUNTER — LAB VISIT (OUTPATIENT)
Dept: LAB | Facility: HOSPITAL | Age: 76
End: 2021-01-15
Attending: NURSE PRACTITIONER
Payer: MEDICARE

## 2021-01-15 DIAGNOSIS — E78.2 MIXED HYPERLIPIDEMIA: ICD-10-CM

## 2021-01-15 DIAGNOSIS — Z11.59 ENCOUNTER FOR HEPATITIS C SCREENING TEST FOR LOW RISK PATIENT: ICD-10-CM

## 2021-01-15 DIAGNOSIS — I10 HYPERTENSION, UNSPECIFIED TYPE: ICD-10-CM

## 2021-01-15 LAB
ALBUMIN SERPL BCP-MCNC: 4.1 G/DL (ref 3.5–5.2)
ALP SERPL-CCNC: 61 U/L (ref 55–135)
ALT SERPL W/O P-5'-P-CCNC: 29 U/L (ref 10–44)
ANION GAP SERPL CALC-SCNC: 10 MMOL/L (ref 8–16)
AST SERPL-CCNC: 26 U/L (ref 10–40)
BASOPHILS # BLD AUTO: 0.05 K/UL (ref 0–0.2)
BASOPHILS NFR BLD: 0.7 % (ref 0–1.9)
BILIRUB SERPL-MCNC: 1.2 MG/DL (ref 0.1–1)
BUN SERPL-MCNC: 20 MG/DL (ref 8–23)
CALCIUM SERPL-MCNC: 9.6 MG/DL (ref 8.7–10.5)
CHLORIDE SERPL-SCNC: 103 MMOL/L (ref 95–110)
CHOLEST SERPL-MCNC: 228 MG/DL (ref 120–199)
CHOLEST/HDLC SERPL: 3.5 {RATIO} (ref 2–5)
CO2 SERPL-SCNC: 27 MMOL/L (ref 23–29)
CREAT SERPL-MCNC: 0.7 MG/DL (ref 0.5–1.4)
DIFFERENTIAL METHOD: ABNORMAL
EOSINOPHIL # BLD AUTO: 0.1 K/UL (ref 0–0.5)
EOSINOPHIL NFR BLD: 1.3 % (ref 0–8)
ERYTHROCYTE [DISTWIDTH] IN BLOOD BY AUTOMATED COUNT: 12.3 % (ref 11.5–14.5)
EST. GFR  (AFRICAN AMERICAN): >60 ML/MIN/1.73 M^2
EST. GFR  (NON AFRICAN AMERICAN): >60 ML/MIN/1.73 M^2
GLUCOSE SERPL-MCNC: 100 MG/DL (ref 70–110)
HCT VFR BLD AUTO: 44.1 % (ref 37–48.5)
HDLC SERPL-MCNC: 66 MG/DL (ref 40–75)
HDLC SERPL: 28.9 % (ref 20–50)
HGB BLD-MCNC: 14.7 G/DL (ref 12–16)
IMM GRANULOCYTES # BLD AUTO: 0.02 K/UL (ref 0–0.04)
IMM GRANULOCYTES NFR BLD AUTO: 0.3 % (ref 0–0.5)
LDLC SERPL CALC-MCNC: 149.4 MG/DL (ref 63–159)
LYMPHOCYTES # BLD AUTO: 1.7 K/UL (ref 1–4.8)
LYMPHOCYTES NFR BLD: 25.9 % (ref 18–48)
MAGNESIUM SERPL-MCNC: 1.9 MG/DL (ref 1.6–2.6)
MCH RBC QN AUTO: 32.7 PG (ref 27–31)
MCHC RBC AUTO-ENTMCNC: 33.3 G/DL (ref 32–36)
MCV RBC AUTO: 98 FL (ref 82–98)
MONOCYTES # BLD AUTO: 0.4 K/UL (ref 0.3–1)
MONOCYTES NFR BLD: 6 % (ref 4–15)
NEUTROPHILS # BLD AUTO: 4.4 K/UL (ref 1.8–7.7)
NEUTROPHILS NFR BLD: 65.8 % (ref 38–73)
NONHDLC SERPL-MCNC: 162 MG/DL
NRBC BLD-RTO: 0 /100 WBC
PLATELET # BLD AUTO: 238 K/UL (ref 150–350)
PMV BLD AUTO: 10.4 FL (ref 9.2–12.9)
POTASSIUM SERPL-SCNC: 4.2 MMOL/L (ref 3.5–5.1)
PROT SERPL-MCNC: 7.1 G/DL (ref 6–8.4)
RBC # BLD AUTO: 4.5 M/UL (ref 4–5.4)
SODIUM SERPL-SCNC: 140 MMOL/L (ref 136–145)
TRIGL SERPL-MCNC: 63 MG/DL (ref 30–150)
WBC # BLD AUTO: 6.67 K/UL (ref 3.9–12.7)

## 2021-01-15 PROCEDURE — 80053 COMPREHEN METABOLIC PANEL: CPT

## 2021-01-15 PROCEDURE — 83735 ASSAY OF MAGNESIUM: CPT

## 2021-01-15 PROCEDURE — 85025 COMPLETE CBC W/AUTO DIFF WBC: CPT

## 2021-01-15 PROCEDURE — 80061 LIPID PANEL: CPT

## 2021-01-15 PROCEDURE — 86803 HEPATITIS C AB TEST: CPT

## 2021-01-15 PROCEDURE — 36415 COLL VENOUS BLD VENIPUNCTURE: CPT

## 2021-01-16 LAB — HCV AB S/CO SERPL IA: 5.1 S/CO RATIO (ref 0–0.9)

## 2021-01-19 ENCOUNTER — TELEPHONE (OUTPATIENT)
Dept: CARDIOLOGY | Facility: CLINIC | Age: 76
End: 2021-01-19

## 2021-01-22 ENCOUNTER — TELEPHONE (OUTPATIENT)
Dept: CARDIOLOGY | Facility: CLINIC | Age: 76
End: 2021-01-22

## 2021-02-01 ENCOUNTER — TELEPHONE (OUTPATIENT)
Dept: CARDIOLOGY | Facility: CLINIC | Age: 76
End: 2021-02-01

## 2021-02-09 ENCOUNTER — OFFICE VISIT (OUTPATIENT)
Dept: CARDIOLOGY | Facility: CLINIC | Age: 76
End: 2021-02-09
Payer: MEDICARE

## 2021-02-09 VITALS
HEIGHT: 65 IN | DIASTOLIC BLOOD PRESSURE: 88 MMHG | RESPIRATION RATE: 18 BRPM | SYSTOLIC BLOOD PRESSURE: 132 MMHG | WEIGHT: 145 LBS | OXYGEN SATURATION: 97 % | HEART RATE: 71 BPM | BODY MASS INDEX: 24.16 KG/M2

## 2021-02-09 DIAGNOSIS — I10 ESSENTIAL HYPERTENSION: Primary | ICD-10-CM

## 2021-02-09 DIAGNOSIS — E03.9 ACQUIRED HYPOTHYROIDISM: ICD-10-CM

## 2021-02-09 DIAGNOSIS — E78.2 MIXED HYPERLIPIDEMIA: ICD-10-CM

## 2021-02-09 PROCEDURE — 93000 ELECTROCARDIOGRAM COMPLETE: CPT | Mod: S$GLB,,, | Performed by: INTERNAL MEDICINE

## 2021-02-09 PROCEDURE — 99213 OFFICE O/P EST LOW 20 MIN: CPT | Mod: 25,S$GLB,, | Performed by: INTERNAL MEDICINE

## 2021-02-09 PROCEDURE — 93000 EKG 12-LEAD: ICD-10-PCS | Mod: S$GLB,,, | Performed by: INTERNAL MEDICINE

## 2021-02-09 PROCEDURE — 99213 PR OFFICE/OUTPT VISIT, EST, LEVL III, 20-29 MIN: ICD-10-PCS | Mod: 25,S$GLB,, | Performed by: INTERNAL MEDICINE

## 2021-03-02 ENCOUNTER — TELEPHONE (OUTPATIENT)
Dept: FAMILY MEDICINE | Facility: CLINIC | Age: 76
End: 2021-03-02

## 2021-03-02 DIAGNOSIS — R76.8 POSITIVE HEPATITIS C ANTIBODY TEST: Primary | ICD-10-CM

## 2021-03-18 ENCOUNTER — TELEPHONE (OUTPATIENT)
Dept: FAMILY MEDICINE | Facility: CLINIC | Age: 76
End: 2021-03-18

## 2021-03-30 ENCOUNTER — PATIENT MESSAGE (OUTPATIENT)
Dept: FAMILY MEDICINE | Facility: CLINIC | Age: 76
End: 2021-03-30

## 2021-04-05 ENCOUNTER — TELEPHONE (OUTPATIENT)
Dept: CARDIOLOGY | Facility: CLINIC | Age: 76
End: 2021-04-05

## 2021-05-17 ENCOUNTER — LAB VISIT (OUTPATIENT)
Dept: LAB | Facility: HOSPITAL | Age: 76
End: 2021-05-17
Attending: INTERNAL MEDICINE
Payer: MEDICARE

## 2021-05-17 DIAGNOSIS — I51.9 MYXEDEMA HEART DISEASE: Primary | ICD-10-CM

## 2021-05-17 DIAGNOSIS — E03.9 MYXEDEMA HEART DISEASE: Primary | ICD-10-CM

## 2021-05-17 LAB
T4 FREE SERPL-MCNC: 0.77 NG/DL (ref 0.71–1.51)
TSH SERPL DL<=0.005 MIU/L-ACNC: 2.51 UIU/ML (ref 0.34–5.6)

## 2021-05-17 PROCEDURE — 84439 ASSAY OF FREE THYROXINE: CPT | Performed by: INTERNAL MEDICINE

## 2021-05-17 PROCEDURE — 84443 ASSAY THYROID STIM HORMONE: CPT | Performed by: INTERNAL MEDICINE

## 2021-05-17 PROCEDURE — 36415 COLL VENOUS BLD VENIPUNCTURE: CPT | Performed by: INTERNAL MEDICINE

## 2021-05-26 ENCOUNTER — TELEPHONE (OUTPATIENT)
Dept: CARDIOLOGY | Facility: CLINIC | Age: 76
End: 2021-05-26

## 2021-06-21 ENCOUNTER — OFFICE VISIT (OUTPATIENT)
Dept: CARDIOLOGY | Facility: CLINIC | Age: 76
End: 2021-06-21
Payer: MEDICARE

## 2021-06-21 VITALS
WEIGHT: 152 LBS | BODY MASS INDEX: 25.33 KG/M2 | DIASTOLIC BLOOD PRESSURE: 70 MMHG | HEIGHT: 65 IN | HEART RATE: 68 BPM | SYSTOLIC BLOOD PRESSURE: 122 MMHG | OXYGEN SATURATION: 99 %

## 2021-06-21 DIAGNOSIS — E89.0 POSTABLATIVE HYPOTHYROIDISM: ICD-10-CM

## 2021-06-21 DIAGNOSIS — E78.00 HYPERCHOLESTEROLEMIA: ICD-10-CM

## 2021-06-21 DIAGNOSIS — R07.9 CHEST PAIN, UNSPECIFIED TYPE: ICD-10-CM

## 2021-06-21 DIAGNOSIS — I25.10 ASCVD (ARTERIOSCLEROTIC CARDIOVASCULAR DISEASE): ICD-10-CM

## 2021-06-21 DIAGNOSIS — F41.9 ANXIETY: Primary | ICD-10-CM

## 2021-06-21 PROCEDURE — 99204 OFFICE O/P NEW MOD 45 MIN: CPT | Mod: S$GLB,,, | Performed by: SPECIALIST

## 2021-06-21 PROCEDURE — 99204 PR OFFICE/OUTPT VISIT, NEW, LEVL IV, 45-59 MIN: ICD-10-PCS | Mod: S$GLB,,, | Performed by: SPECIALIST

## 2021-06-25 ENCOUNTER — LAB VISIT (OUTPATIENT)
Dept: LAB | Facility: HOSPITAL | Age: 76
End: 2021-06-25
Attending: SPECIALIST
Payer: MEDICARE

## 2021-06-25 DIAGNOSIS — E78.00 HYPERCHOLESTEROLEMIA: ICD-10-CM

## 2021-06-25 DIAGNOSIS — I25.10 ASCVD (ARTERIOSCLEROTIC CARDIOVASCULAR DISEASE): ICD-10-CM

## 2021-06-25 DIAGNOSIS — R07.9 CHEST PAIN, UNSPECIFIED TYPE: ICD-10-CM

## 2021-06-25 LAB
BASOPHILS # BLD AUTO: 0.04 K/UL (ref 0–0.2)
BASOPHILS NFR BLD: 0.6 % (ref 0–1.9)
CHOLEST SERPL-MCNC: 159 MG/DL (ref 120–199)
CHOLEST/HDLC SERPL: 3 {RATIO} (ref 2–5)
DIFFERENTIAL METHOD: ABNORMAL
EOSINOPHIL # BLD AUTO: 0.1 K/UL (ref 0–0.5)
EOSINOPHIL NFR BLD: 1.6 % (ref 0–8)
ERYTHROCYTE [DISTWIDTH] IN BLOOD BY AUTOMATED COUNT: 12 % (ref 11.5–14.5)
HCT VFR BLD AUTO: 40.9 % (ref 37–48.5)
HDLC SERPL-MCNC: 53 MG/DL (ref 40–75)
HDLC SERPL: 33.3 % (ref 20–50)
HGB BLD-MCNC: 13.8 G/DL (ref 12–16)
IMM GRANULOCYTES # BLD AUTO: 0.03 K/UL (ref 0–0.04)
IMM GRANULOCYTES NFR BLD AUTO: 0.4 % (ref 0–0.5)
LDLC SERPL CALC-MCNC: 84.8 MG/DL (ref 63–159)
LYMPHOCYTES # BLD AUTO: 1.7 K/UL (ref 1–4.8)
LYMPHOCYTES NFR BLD: 24.5 % (ref 18–48)
MCH RBC QN AUTO: 32.2 PG (ref 27–31)
MCHC RBC AUTO-ENTMCNC: 33.7 G/DL (ref 32–36)
MCV RBC AUTO: 96 FL (ref 82–98)
MONOCYTES # BLD AUTO: 0.5 K/UL (ref 0.3–1)
MONOCYTES NFR BLD: 6.9 % (ref 4–15)
NEUTROPHILS # BLD AUTO: 4.6 K/UL (ref 1.8–7.7)
NEUTROPHILS NFR BLD: 66 % (ref 38–73)
NONHDLC SERPL-MCNC: 106 MG/DL
NRBC BLD-RTO: 0 /100 WBC
PLATELET # BLD AUTO: 227 K/UL (ref 150–450)
PMV BLD AUTO: 10.7 FL (ref 9.2–12.9)
RBC # BLD AUTO: 4.28 M/UL (ref 4–5.4)
TRIGL SERPL-MCNC: 106 MG/DL (ref 30–150)
WBC # BLD AUTO: 6.94 K/UL (ref 3.9–12.7)

## 2021-06-25 PROCEDURE — 36415 COLL VENOUS BLD VENIPUNCTURE: CPT | Performed by: SPECIALIST

## 2021-06-25 PROCEDURE — 80061 LIPID PANEL: CPT | Performed by: SPECIALIST

## 2021-06-25 PROCEDURE — 85025 COMPLETE CBC W/AUTO DIFF WBC: CPT | Performed by: SPECIALIST

## 2021-06-28 ENCOUNTER — HOSPITAL ENCOUNTER (OUTPATIENT)
Dept: CARDIOLOGY | Facility: CLINIC | Age: 76
Discharge: HOME | End: 2021-06-28
Attending: SPECIALIST
Payer: MEDICARE

## 2021-06-28 VITALS — WEIGHT: 152 LBS | BODY MASS INDEX: 25.33 KG/M2 | HEIGHT: 65 IN

## 2021-06-28 DIAGNOSIS — E78.00 HYPERCHOLESTEROLEMIA: ICD-10-CM

## 2021-06-28 DIAGNOSIS — I25.10 ASCVD (ARTERIOSCLEROTIC CARDIOVASCULAR DISEASE): ICD-10-CM

## 2021-06-28 PROCEDURE — 93306 TTE W/DOPPLER COMPLETE: CPT | Mod: S$GLB,,, | Performed by: SPECIALIST

## 2021-06-28 PROCEDURE — 93306 ECHO (CUPID ONLY): ICD-10-PCS | Mod: S$GLB,,, | Performed by: SPECIALIST

## 2021-07-20 LAB
AORTIC ROOT ANNULUS: 3.1 CM
AORTIC VALVE CUSP SEPERATION: 1.9 CM
AV INDEX (PROSTH): 0.91
AV MEAN GRADIENT: 7 MMHG
AV PEAK GRADIENT: 12 MMHG
AV VALVE AREA: 2.87 CM2
AV VELOCITY RATIO: 0.83
BSA FOR ECHO PROCEDURE: 1.78 M2
CV ECHO LV RWT: 0.45 CM
DOP CALC AO PEAK VEL: 1.72 M/S
DOP CALC AO VTI: 34.6 CM
DOP CALC LVOT AREA: 3.1 CM2
DOP CALC LVOT DIAMETER: 2 CM
DOP CALC LVOT PEAK VEL: 1.43 M/S
DOP CALC LVOT STROKE VOLUME: 99.22 CM3
DOP CALCLVOT PEAK VEL VTI: 31.6 CM
E WAVE DECELERATION TIME: 296 MS
E/A RATIO: 0.68
E/E' RATIO: 9.73 M/S
ECHO LV POSTERIOR WALL: 1.15 CM (ref 0.6–1.1)
EJECTION FRACTION: 79 %
FRACTIONAL SHORTENING: 40 % (ref 28–44)
INTERVENTRICULAR SEPTUM: 1.19 CM (ref 0.6–1.1)
IVRT: 95 MS
LA MAJOR: 4.2 CM
LEFT ATRIUM SIZE: 4.1 CM
LEFT INTERNAL DIMENSION IN SYSTOLE: 3.04 CM (ref 2.1–4)
LEFT VENTRICLE MASS INDEX: 131 G/M2
LEFT VENTRICULAR INTERNAL DIMENSION IN DIASTOLE: 5.08 CM (ref 3.5–6)
LEFT VENTRICULAR MASS: 231.42 G
LV LATERAL E/E' RATIO: 6.64 M/S
LV SEPTAL E/E' RATIO: 18.25 M/S
MV PEAK A VEL: 1.07 M/S
MV PEAK E VEL: 0.73 M/S
PISA TR MAX VEL: 2.51 M/S
RA PRESSURE: 3 MMHG
RIGHT VENTRICULAR END-DIASTOLIC DIMENSION: 2.48 CM
TDI LATERAL: 0.11 M/S
TDI SEPTAL: 0.04 M/S
TDI: 0.08 M/S
TR MAX PG: 25 MMHG
TV REST PULMONARY ARTERY PRESSURE: 28 MMHG

## 2021-07-27 ENCOUNTER — LAB VISIT (OUTPATIENT)
Dept: LAB | Facility: HOSPITAL | Age: 76
End: 2021-07-27
Attending: INTERNAL MEDICINE
Payer: MEDICARE

## 2021-07-27 DIAGNOSIS — B18.2 CHRONIC HEPATITIS C WITH HEPATIC COMA: Primary | ICD-10-CM

## 2021-07-27 PROCEDURE — 87522 HEPATITIS C REVRS TRNSCRPJ: CPT | Performed by: INTERNAL MEDICINE

## 2021-07-27 PROCEDURE — 30000890 LABCORP MISCELLANEOUS TEST: Performed by: INTERNAL MEDICINE

## 2021-07-27 PROCEDURE — 87902 NFCT AGT GNTYP ALYS HEP C: CPT | Performed by: INTERNAL MEDICINE

## 2021-07-27 PROCEDURE — 36415 COLL VENOUS BLD VENIPUNCTURE: CPT | Performed by: INTERNAL MEDICINE

## 2021-07-29 LAB
HCV PCR QNT TEST INFORMATION: NORMAL
HCV RNA SERPL NAA+PROBE-ACNC: NORMAL IU/ML

## 2021-08-01 LAB
LABCORP MISC TEST CODE: NORMAL
LABCORP MISC TEST NAME: NORMAL
LABCORP MISCELLANEOUS TEST: NORMAL

## 2021-08-06 DIAGNOSIS — M81.0 SENILE OSTEOPOROSIS: Primary | ICD-10-CM

## 2021-08-13 ENCOUNTER — HOSPITAL ENCOUNTER (OUTPATIENT)
Dept: RADIOLOGY | Facility: HOSPITAL | Age: 76
Discharge: HOME | End: 2021-08-13
Attending: SPECIALIST
Payer: MEDICARE

## 2021-08-13 DIAGNOSIS — M81.0 SENILE OSTEOPOROSIS: ICD-10-CM

## 2021-08-13 PROCEDURE — 77080 DXA BONE DENSITY AXIAL: CPT | Mod: TC,PO

## 2021-10-21 RX ORDER — RAMIPRIL 2.5 MG/1
2.5 CAPSULE ORAL DAILY
Qty: 90 CAPSULE | Refills: 3 | Status: SHIPPED | OUTPATIENT
Start: 2021-10-21 | End: 2022-12-19 | Stop reason: SDUPTHER

## 2021-11-09 DIAGNOSIS — M81.0 SENILE OSTEOPOROSIS: Primary | ICD-10-CM

## 2021-11-16 ENCOUNTER — LAB VISIT (OUTPATIENT)
Dept: LAB | Facility: HOSPITAL | Age: 76
End: 2021-11-16
Attending: INTERNAL MEDICINE
Payer: MEDICARE

## 2021-11-16 DIAGNOSIS — Z79.899 ENCOUNTER FOR LONG-TERM (CURRENT) USE OF OTHER MEDICATIONS: Primary | ICD-10-CM

## 2021-11-16 DIAGNOSIS — M81.0 SENILE OSTEOPOROSIS: ICD-10-CM

## 2021-11-16 DIAGNOSIS — E03.9 MYXEDEMA HEART DISEASE: ICD-10-CM

## 2021-11-16 DIAGNOSIS — I51.9 MYXEDEMA HEART DISEASE: ICD-10-CM

## 2021-11-16 LAB
25(OH)D3+25(OH)D2 SERPL-MCNC: 32 NG/ML (ref 30–96)
ANION GAP SERPL CALC-SCNC: 6 MMOL/L (ref 8–16)
BUN SERPL-MCNC: 20 MG/DL (ref 8–23)
CALCIUM SERPL-MCNC: 9.4 MG/DL (ref 8.7–10.5)
CHLORIDE SERPL-SCNC: 105 MMOL/L (ref 95–110)
CO2 SERPL-SCNC: 28 MMOL/L (ref 23–29)
CREAT SERPL-MCNC: 0.8 MG/DL (ref 0.5–1.4)
EST. GFR  (AFRICAN AMERICAN): >60 ML/MIN/1.73 M^2
EST. GFR  (NON AFRICAN AMERICAN): >60 ML/MIN/1.73 M^2
GLUCOSE SERPL-MCNC: 106 MG/DL (ref 70–110)
POTASSIUM SERPL-SCNC: 4.4 MMOL/L (ref 3.5–5.1)
SODIUM SERPL-SCNC: 139 MMOL/L (ref 136–145)
T4 FREE SERPL-MCNC: 0.9 NG/DL (ref 0.71–1.51)
TSH SERPL DL<=0.005 MIU/L-ACNC: 4.87 UIU/ML (ref 0.34–5.6)

## 2021-11-16 PROCEDURE — 80048 BASIC METABOLIC PNL TOTAL CA: CPT | Performed by: INTERNAL MEDICINE

## 2021-11-16 PROCEDURE — 84439 ASSAY OF FREE THYROXINE: CPT | Performed by: INTERNAL MEDICINE

## 2021-11-16 PROCEDURE — 82306 VITAMIN D 25 HYDROXY: CPT | Performed by: INTERNAL MEDICINE

## 2021-11-16 PROCEDURE — 36415 COLL VENOUS BLD VENIPUNCTURE: CPT | Performed by: INTERNAL MEDICINE

## 2021-11-16 PROCEDURE — 84443 ASSAY THYROID STIM HORMONE: CPT | Performed by: INTERNAL MEDICINE

## 2021-11-29 ENCOUNTER — OFFICE VISIT (OUTPATIENT)
Dept: CARDIOLOGY | Facility: CLINIC | Age: 76
End: 2021-11-29
Payer: MEDICARE

## 2021-11-29 VITALS
HEART RATE: 66 BPM | WEIGHT: 149 LBS | HEIGHT: 65 IN | DIASTOLIC BLOOD PRESSURE: 92 MMHG | SYSTOLIC BLOOD PRESSURE: 140 MMHG | BODY MASS INDEX: 24.83 KG/M2 | OXYGEN SATURATION: 100 %

## 2021-11-29 DIAGNOSIS — F41.9 ANXIETY: ICD-10-CM

## 2021-11-29 DIAGNOSIS — I10 PRIMARY HYPERTENSION: Primary | ICD-10-CM

## 2021-11-29 PROCEDURE — 99214 OFFICE O/P EST MOD 30 MIN: CPT | Mod: S$GLB,,, | Performed by: SPECIALIST

## 2021-11-29 PROCEDURE — 99214 PR OFFICE/OUTPT VISIT, EST, LEVL IV, 30-39 MIN: ICD-10-PCS | Mod: S$GLB,,, | Performed by: SPECIALIST

## 2021-12-22 RX ORDER — ROSUVASTATIN CALCIUM 10 MG/1
TABLET, FILM COATED ORAL
Qty: 90 TABLET | Refills: 3 | Status: CANCELLED | OUTPATIENT
Start: 2021-12-22

## 2021-12-22 NOTE — TELEPHONE ENCOUNTER
----- Message from Rafael Spears sent at 12/22/2021 10:19 AM CST -----  Type:  RX Refill Request    Who Called:  [facility]  Refill or New Rx:  refill  RX Name and Strength:  CRESTOR 10 mg tablet  How is the patient currently taking it? (ex. 1XDay):  as directed  Is this a 30 day or 90 day RX:  90  Preferred Pharmacy with phone number:      [facility] - [place], [place] - [address] AT Portal to Registered [place] [address]  [facility] [address]  Phone: [phone] Fax: [phone]      Local or Mail Order:  mail order  Ordering Provider:  Yolande Hirsch Call Back Number:  [phone]  Additional Information:  Reference # [id number]  ===  Please advise -- Thank you

## 2021-12-23 RX ORDER — ROSUVASTATIN CALCIUM 10 MG/1
TABLET, FILM COATED ORAL
Qty: 90 TABLET | Refills: 3 | Status: SHIPPED | OUTPATIENT
Start: 2021-12-23 | End: 2023-03-02

## 2021-12-23 RX ORDER — ROSUVASTATIN CALCIUM 10 MG/1
TABLET, FILM COATED ORAL
Qty: 90 TABLET | Refills: 3 | Status: CANCELLED | OUTPATIENT
Start: 2021-12-23

## 2021-12-23 NOTE — TELEPHONE ENCOUNTER
----- Message from Jhon Lee sent at 12/23/2021 10:16 AM CST -----  Contact: [facility]  Type:  RX Refill Request    Who Called: jose alfredo england  Refill or New Rx:  refill  RX Name and Strength:  CRESTOR 10 mg tablet  How is the patient currently taking it? (ex. 1XDay):  1xday  Is this a 30 day or 90 day RX:  90  Preferred Pharmacy with phone number:        [facility] - [place], [place] - [address] AT Portal to [address] [facility]  [address]  [facility] [address]  Phone: [phone] Fax: [phone]      Local or Mail Order: order  Ordering Provider:  portillo Hirsch Call Back Number:  [phone]    Additional Information:

## 2021-12-23 NOTE — TELEPHONE ENCOUNTER
----- Message from Pool Estrada sent at 12/23/2021 10:54 AM CST -----  Contact: pharmacy tech  Type:  RX Refill Request    Who [facility]:  [facility], Isela  Refill or New Rx: refill  RX Name and Strength:  Crestor 10mg  How is the patient currently taking it? (ex. 1XDay):  1 pill QPM  Is this a 30 day or 90 day RX:  90  Preferred Pharmacy with phone number:  pls call this number for immediate response [phone] option 2, ref# [id number]    [facility] - [place], [place] - [address] AT Portal to Registered [facility]  [address]  [facility] [address]  Phone: [phone] Fax: [phone]      Local or Mail Order:  mail order  Ordering Provider:  Nabeel Hirsch Call Back Number:  [phone] opt 2  Additional Information:

## 2022-02-22 DIAGNOSIS — Z12.31 ENCOUNTER FOR SCREENING MAMMOGRAM FOR MALIGNANT NEOPLASM OF BREAST: Primary | ICD-10-CM

## 2022-03-10 ENCOUNTER — HOSPITAL ENCOUNTER (OUTPATIENT)
Dept: RADIOLOGY | Facility: HOSPITAL | Age: 77
Discharge: HOME | End: 2022-03-10
Attending: SPECIALIST
Payer: MEDICARE

## 2022-03-10 DIAGNOSIS — Z12.31 ENCOUNTER FOR SCREENING MAMMOGRAM FOR MALIGNANT NEOPLASM OF BREAST: ICD-10-CM

## 2022-03-10 PROCEDURE — 77063 BREAST TOMOSYNTHESIS BI: CPT | Mod: TC,PO

## 2022-06-08 ENCOUNTER — HOSPITAL ENCOUNTER (OUTPATIENT)
Dept: RADIOLOGY | Facility: HOSPITAL | Age: 77
Discharge: HOME | End: 2022-06-08
Attending: ANESTHESIOLOGY
Payer: MEDICARE

## 2022-06-08 DIAGNOSIS — M54.50 LUMBAGO: ICD-10-CM

## 2022-06-08 DIAGNOSIS — M53.3 DISORDER OF SACRUM: ICD-10-CM

## 2022-06-08 DIAGNOSIS — M53.3 DISORDER OF SACRUM: Primary | ICD-10-CM

## 2022-06-08 DIAGNOSIS — M47.816 LUMBAR SPONDYLOSIS: ICD-10-CM

## 2022-06-08 DIAGNOSIS — G89.4 CHRONIC PAIN SYNDROME: ICD-10-CM

## 2022-06-08 PROCEDURE — 72202 X-RAY EXAM SI JOINTS 3/> VWS: CPT | Mod: TC,PO

## 2022-06-17 ENCOUNTER — OFFICE VISIT (OUTPATIENT)
Dept: CARDIOLOGY | Facility: CLINIC | Age: 77
End: 2022-06-17
Payer: MEDICARE

## 2022-06-17 VITALS
WEIGHT: 143.31 LBS | OXYGEN SATURATION: 99 % | HEIGHT: 65 IN | DIASTOLIC BLOOD PRESSURE: 78 MMHG | HEART RATE: 70 BPM | SYSTOLIC BLOOD PRESSURE: 122 MMHG | BODY MASS INDEX: 23.88 KG/M2

## 2022-06-17 DIAGNOSIS — R53.83 FATIGUE, UNSPECIFIED TYPE: ICD-10-CM

## 2022-06-17 DIAGNOSIS — I10 HYPERTENSION, UNSPECIFIED TYPE: Primary | ICD-10-CM

## 2022-06-17 DIAGNOSIS — R42 ORTHOSTATIC DIZZINESS: ICD-10-CM

## 2022-06-17 DIAGNOSIS — E78.00 HYPERCHOLESTEREMIA: ICD-10-CM

## 2022-06-17 PROCEDURE — 99214 PR OFFICE/OUTPT VISIT, EST, LEVL IV, 30-39 MIN: ICD-10-PCS | Mod: S$GLB,,, | Performed by: SPECIALIST

## 2022-06-17 PROCEDURE — 99214 OFFICE O/P EST MOD 30 MIN: CPT | Mod: S$GLB,,, | Performed by: SPECIALIST

## 2022-06-17 NOTE — PATIENT INSTRUCTIONS
Take coenzyme Q 10   over counter     If symptoms bad on chrestor  - cut every other day  or 1/2 a day

## 2022-06-17 NOTE — PROGRESS NOTES
3-4x/weel  For ramiopril -  Takes  Prn   No  Cp  No sob  she is under stress taking care of her   Denies any chest pain or shortness of breath  Subjective:    Patient ID:  Yesy Knight is a 77 y.o. female who presents for   Hypotension    HPI    Review of patient's allergies indicates:  No Known Allergies    Past Medical History:   Diagnosis Date    Hypertension     Wears partial dentures     bottom partials     Past Surgical History:   Procedure Laterality Date    COLPORRHAPHY N/A 02/21/2019    Procedure: COLPORRHAPHY;  Surgeon: Rafael Guevara MD;  Location: [facility] OR;  Service: OB/GYN;  Laterality: N/A;    CYSTOSCOPY N/A 02/21/2019    Procedure: CYSTOSCOPY;  Surgeon: Rafael Guevara MD;  Location: [facility] OR;  Service: OB/GYN;  Laterality: N/A;    HYSTERECTOMY      MOUTH SURGERY  03/2018     second one 10/2018    SACROSPINOUS LIGAMENT FIXATION  02/21/2019    Procedure: FIXATION, LIGAMENT, SACROSPINOUS;  Surgeon: Rafael Guevara MD;  Location: [facility] OR;  Service: OB/GYN;;    STOMACH SURGERY      TOTAL VAGINAL HYSTERECTOMY N/A 02/21/2019    Procedure: HYSTERECTOMY, TOTAL, VAGINAL;  Surgeon: Rafael Guevara MD;  Location: [facility] OR;  Service: OB/GYN;  Laterality: N/A;     Social History     Tobacco Use    Smoking status: Never Smoker    Smokeless tobacco: Never Used   Substance Use Topics    Alcohol use: Yes     Alcohol/week: 2.0 standard drinks     Types: 2 Glasses of wine per week    Drug use: No        Review of Systems     ROS        Objective:        Vitals:    06/17/22 1323   BP: 122/78   Pulse: 70       Lab Results   Component Value Date    WBC 6.94 06/25/2021    HGB 13.8 06/25/2021    HCT 40.9 06/25/2021     06/25/2021    CHOL 159 06/25/2021    TRIG 106 06/25/2021    HDL 53 06/25/2021    ALT 29 01/15/2021    AST 26 01/15/2021     11/16/2021    K 4.4 11/16/2021     11/16/2021    CREATININE 0.8 11/16/2021    BUN 20 11/16/2021    CO2 28 11/16/2021    TSH 4.870 11/16/2021         ECHOCARDIOGRAM RESULTS  Results for orders placed during the hospital encounter of 06/28/21    Echo    Interpretation Summary  · The left ventricle is normal in size with mild concentric hypertrophy and normal systolic function.  · The estimated ejection fraction is 79%.  · Normal left ventricular diastolic function.  · Atrial fibrillation not observed.  · Normal right ventricular size with normal right ventricular systolic function.  · Normal central venous pressure (3 mmHg).  · The estimated PA systolic pressure is 28 mmHg.      CURRENT/PREVIOUS VISIT EKG  Results for orders placed or performed in visit on 02/09/21   IN OFFICE EKG 12-LEAD (to [place])    Collection Time: 02/09/21  9:52 AM    Narrative    Test Reason : I10,    Vent. Rate : 063 BPM     Atrial Rate : 063 BPM     P-R Int : 186 ms          QRS Dur : 078 ms      QT Int : 388 ms       P-R-T Axes : 007 018 025 degrees     QTc Int : 397 ms    Normal sinus rhythm  Septal infarct ,age undetermined  Abnormal ECG  No previous ECGs available  Confirmed by Leticia Mcginnis MD ([id number]) on 2/14/2021 6:05:27 PM    Referred By: JOSE   SELF           Confirmed By:Leticia Mcginnis MD     Results for orders placed during the hospital encounter of 06/28/21    Echo    Interpretation Summary  · The left ventricle is normal in size with mild concentric hypertrophy and normal systolic function.  · The estimated ejection fraction is 79%.  · Normal left ventricular diastolic function.  · Atrial fibrillation not observed.  · Normal right ventricular size with normal right ventricular systolic function.  · Normal central venous pressure (3 mmHg).  · The estimated PA systolic pressure is 28 mmHg.    No results found for this or any previous visit.      PHYSICAL EXAM    Physical Exam 130/70  Lungs are clear  Cardiac rateregular   Abdomen extremities are okay    Medication List with Changes/Refills   Current Medications    CHOLECALCIFEROL, VITAMIN D3, (VITAMIN D3 ORAL)     Take by mouth.    CRESTOR 10 MG TABLET    TAKE 1 TABLET ONCE DAILY INTHE EVENING    HYDROCODONE-ACETAMINOPHEN (NORCO) 7.5-325 MG PER TABLET        IBUPROFEN (ADVIL,MOTRIN) 600 MG TABLET        LEVOTHYROXINE (SYNTHROID) 25 MCG TABLET    TAKE (1) TABLET BY MOUTH ONCE DAILY.    RAMIPRIL (ALTACE) 2.5 MG CAPSULE    Take 1 capsule (2.5 mg total) by mouth once daily.           Assessment:       1. Hypertension, unspecified type    2. Orthostatic dizziness    3. Fatigue, unspecified type    4. Hypercholesteremia    Continues ramipril on a p.r.n. basis     Plan:     Repeat lead  Problem List Items Addressed This Visit        Cardiac/Vascular    Hypertension - Primary      Other Visit Diagnoses     Orthostatic dizziness        Fatigue, unspecified type        Relevant Orders    CBC Auto Differential    Basic Metabolic Panel    Hypercholesteremia        Relevant Orders    Lipid Panel           Follow up in about 10 months (around 4/17/2023).

## 2022-06-30 ENCOUNTER — LAB VISIT (OUTPATIENT)
Dept: LAB | Facility: HOSPITAL | Age: 77
End: 2022-06-30
Attending: SPECIALIST
Payer: MEDICARE

## 2022-06-30 DIAGNOSIS — E78.00 HYPERCHOLESTEREMIA: ICD-10-CM

## 2022-06-30 DIAGNOSIS — R53.83 FATIGUE, UNSPECIFIED TYPE: ICD-10-CM

## 2022-06-30 LAB
ANION GAP SERPL CALC-SCNC: 7 MMOL/L (ref 8–16)
BUN SERPL-MCNC: 29 MG/DL (ref 8–23)
CALCIUM SERPL-MCNC: 9.3 MG/DL (ref 8.7–10.5)
CHLORIDE SERPL-SCNC: 103 MMOL/L (ref 95–110)
CHOLEST SERPL-MCNC: 168 MG/DL (ref 120–199)
CHOLEST/HDLC SERPL: 2.3 {RATIO} (ref 2–5)
CO2 SERPL-SCNC: 28 MMOL/L (ref 23–29)
CREAT SERPL-MCNC: 0.8 MG/DL (ref 0.5–1.4)
EST. GFR  (AFRICAN AMERICAN): >60 ML/MIN/1.73 M^2
EST. GFR  (NON AFRICAN AMERICAN): >60 ML/MIN/1.73 M^2
GLUCOSE SERPL-MCNC: 94 MG/DL (ref 70–110)
HDLC SERPL-MCNC: 72 MG/DL (ref 40–75)
HDLC SERPL: 42.9 % (ref 20–50)
LDLC SERPL CALC-MCNC: 87.8 MG/DL (ref 63–159)
NONHDLC SERPL-MCNC: 96 MG/DL
POTASSIUM SERPL-SCNC: 3.9 MMOL/L (ref 3.5–5.1)
SODIUM SERPL-SCNC: 138 MMOL/L (ref 136–145)
TRIGL SERPL-MCNC: 41 MG/DL (ref 30–150)

## 2022-06-30 PROCEDURE — 80061 LIPID PANEL: CPT | Performed by: SPECIALIST

## 2022-06-30 PROCEDURE — 80048 BASIC METABOLIC PNL TOTAL CA: CPT | Performed by: SPECIALIST

## 2022-06-30 PROCEDURE — 36415 COLL VENOUS BLD VENIPUNCTURE: CPT | Performed by: SPECIALIST

## 2022-09-23 ENCOUNTER — LAB VISIT (OUTPATIENT)
Dept: LAB | Facility: HOSPITAL | Age: 77
End: 2022-09-23
Attending: INTERNAL MEDICINE
Payer: MEDICARE

## 2022-09-23 DIAGNOSIS — E03.9 MYXEDEMA HEART DISEASE: Primary | ICD-10-CM

## 2022-09-23 DIAGNOSIS — I51.9 MYXEDEMA HEART DISEASE: Primary | ICD-10-CM

## 2022-09-23 LAB
T4 FREE SERPL-MCNC: 0.82 NG/DL (ref 0.71–1.51)
TSH SERPL DL<=0.005 MIU/L-ACNC: 4.33 UIU/ML (ref 0.34–5.6)

## 2022-09-23 PROCEDURE — 36415 COLL VENOUS BLD VENIPUNCTURE: CPT | Performed by: INTERNAL MEDICINE

## 2022-09-23 PROCEDURE — 84439 ASSAY OF FREE THYROXINE: CPT | Performed by: INTERNAL MEDICINE

## 2022-09-23 PROCEDURE — 84443 ASSAY THYROID STIM HORMONE: CPT | Performed by: INTERNAL MEDICINE

## 2022-12-19 RX ORDER — RAMIPRIL 2.5 MG/1
2.5 CAPSULE ORAL DAILY
Qty: 90 CAPSULE | Refills: 3 | Status: SHIPPED | OUTPATIENT
Start: 2022-12-19 | End: 2023-11-27 | Stop reason: SDUPTHER

## 2022-12-19 NOTE — TELEPHONE ENCOUNTER
----- Message from Renetta Malhotra sent at 12/19/2022 12:50 PM CST -----  Type:  RX Refill Request    Who Called:  pt  Refill or New Rx:  refill  RX Name and Strength:  ramipriL (ALTACE) 2.5 MG capsule  How is the patient currently taking it? (ex. 1XDay):  as directed  Is this a 30 day or 90 day RX:  90  Preferred Pharmacy with phone number:    [facility] - [place], [place] - [address] [facility]  [address] [facility] [address]  Phone: [phone] Fax: [phone]    Local or Mail Order:  local  Ordering Provider:  Nabeel Hirsch Call Back Number:  [phone]  Additional Information:  thank you

## 2023-01-12 ENCOUNTER — OFFICE VISIT (OUTPATIENT)
Dept: ORTHOPEDICS | Facility: CLINIC | Age: 78
End: 2023-01-12
Payer: MEDICARE

## 2023-01-12 VITALS — BODY MASS INDEX: 24.83 KG/M2 | HEIGHT: 65 IN | WEIGHT: 149 LBS

## 2023-01-12 DIAGNOSIS — Q76.49 CONGENITAL FUSION OF LUMBAR SPINE: ICD-10-CM

## 2023-01-12 DIAGNOSIS — M70.61 GREATER TROCHANTERIC BURSITIS OF RIGHT HIP: ICD-10-CM

## 2023-01-12 DIAGNOSIS — M41.86 OTHER FORM OF SCOLIOSIS OF LUMBAR SPINE: ICD-10-CM

## 2023-01-12 DIAGNOSIS — M70.62 GREATER TROCHANTERIC BURSITIS OF LEFT HIP: ICD-10-CM

## 2023-01-12 DIAGNOSIS — M51.36 DDD (DEGENERATIVE DISC DISEASE), LUMBAR: ICD-10-CM

## 2023-01-12 DIAGNOSIS — M50.30 DDD (DEGENERATIVE DISC DISEASE), CERVICAL: Primary | ICD-10-CM

## 2023-01-12 PROCEDURE — 99203 PR OFFICE/OUTPT VISIT, NEW, LEVL III, 30-44 MIN: ICD-10-PCS | Mod: 25,S$GLB,, | Performed by: ORTHOPAEDIC SURGERY

## 2023-01-12 PROCEDURE — 99203 OFFICE O/P NEW LOW 30 MIN: CPT | Mod: 25,S$GLB,, | Performed by: ORTHOPAEDIC SURGERY

## 2023-01-12 PROCEDURE — 20610 LARGE JOINT ASPIRATION/INJECTION: R GREATER TROCHANTERIC BURSA: ICD-10-PCS | Mod: 50,S$GLB,, | Performed by: ORTHOPAEDIC SURGERY

## 2023-01-12 PROCEDURE — 20610 DRAIN/INJ JOINT/BURSA W/O US: CPT | Mod: 50,S$GLB,, | Performed by: ORTHOPAEDIC SURGERY

## 2023-01-12 RX ORDER — TRIAMCINOLONE ACETONIDE 40 MG/ML
40 INJECTION, SUSPENSION INTRA-ARTICULAR; INTRAMUSCULAR
Status: DISCONTINUED | OUTPATIENT
Start: 2023-01-12 | End: 2023-01-12 | Stop reason: HOSPADM

## 2023-01-12 RX ADMIN — TRIAMCINOLONE ACETONIDE 40 MG: 40 INJECTION, SUSPENSION INTRA-ARTICULAR; INTRAMUSCULAR at 10:01

## 2023-01-12 NOTE — PROCEDURES
Large Joint Aspiration/Injection: R greater trochanteric bursa    Date/Time: 1/12/2023 10:30 AM  Performed by: Rashid Sommer MD  Authorized by: Rashid Sommer MD     Consent Done?:  Yes (Verbal)  Indications:  Pain  Site marked: the procedure site was marked    Timeout: prior to procedure the correct patient, procedure, and site was verified    Prep: patient was prepped and draped in usual sterile fashion      Local anesthesia used?: Yes    Local anesthetic:  Lidocaine 1% without epinephrine  Ultrasonic Guidance for needle placement?: No    Location:  Hip  Site:  R greater trochanteric bursa  Medications:  40 mg triamcinolone acetonide 40 mg/mL  Patient tolerance:  Patient tolerated the procedure well with no immediate complications  Large Joint Aspiration/Injection: L greater trochanteric bursa    Date/Time: 1/12/2023 10:30 AM  Performed by: Rashid Sommer MD  Authorized by: Rashid Sommer MD     Consent Done?:  Yes (Verbal)  Indications:  Pain  Site marked: the procedure site was marked    Timeout: prior to procedure the correct patient, procedure, and site was verified    Prep: patient was prepped and draped in usual sterile fashion      Local anesthesia used?: Yes    Local anesthetic:  Lidocaine 1% without epinephrine  Ultrasonic Guidance for needle placement?: No    Location:  Hip  Site:  L greater trochanteric bursa  Medications:  40 mg triamcinolone acetonide 40 mg/mL  Patient tolerance:  Patient tolerated the procedure well with no immediate complications

## 2023-01-31 ENCOUNTER — HOSPITAL ENCOUNTER (OUTPATIENT)
Dept: RADIOLOGY | Facility: HOSPITAL | Age: 78
Discharge: HOME | End: 2023-01-31
Attending: ORTHOPAEDIC SURGERY
Payer: MEDICARE

## 2023-01-31 DIAGNOSIS — Q76.49 CONGENITAL FUSION OF LUMBAR SPINE: ICD-10-CM

## 2023-01-31 DIAGNOSIS — M51.36 DDD (DEGENERATIVE DISC DISEASE), LUMBAR: ICD-10-CM

## 2023-01-31 DIAGNOSIS — M41.86 OTHER FORM OF SCOLIOSIS OF LUMBAR SPINE: ICD-10-CM

## 2023-01-31 PROCEDURE — 72148 MRI LUMBAR SPINE W/O DYE: CPT | Mod: TC,PO

## 2023-02-06 ENCOUNTER — TELEPHONE (OUTPATIENT)
Dept: PAIN MEDICINE | Facility: CLINIC | Age: 78
End: 2023-02-06
Payer: MEDICARE

## 2023-02-06 NOTE — TELEPHONE ENCOUNTER
Pt wants to know about a referral just for a procedure, she thought since after she had an MRI that Dr Sommer was going to send a referral for a procedure, or does he want the consult first? Thank you.

## 2023-02-06 NOTE — TELEPHONE ENCOUNTER
----- Message from Stacey M Lefort sent at 2/6/2023 10:17 AM CST -----  Pt is calling in regard to making a referral appt. She can be reached at [phone]. Thank you.

## 2023-02-13 ENCOUNTER — TELEPHONE (OUTPATIENT)
Dept: PAIN MEDICINE | Facility: CLINIC | Age: 78
End: 2023-02-13

## 2023-02-13 ENCOUNTER — OFFICE VISIT (OUTPATIENT)
Dept: PAIN MEDICINE | Facility: CLINIC | Age: 78
End: 2023-02-13
Payer: MEDICARE

## 2023-02-13 VITALS
HEIGHT: 65 IN | HEART RATE: 84 BPM | SYSTOLIC BLOOD PRESSURE: 161 MMHG | BODY MASS INDEX: 24.83 KG/M2 | DIASTOLIC BLOOD PRESSURE: 95 MMHG | WEIGHT: 149.06 LBS

## 2023-02-13 DIAGNOSIS — M47.896 OTHER SPONDYLOSIS, LUMBAR REGION: ICD-10-CM

## 2023-02-13 DIAGNOSIS — M51.36 DDD (DEGENERATIVE DISC DISEASE), LUMBAR: ICD-10-CM

## 2023-02-13 DIAGNOSIS — M54.16 LUMBAR RADICULOPATHY: ICD-10-CM

## 2023-02-13 DIAGNOSIS — M47.896 OTHER SPONDYLOSIS, LUMBAR REGION: Primary | ICD-10-CM

## 2023-02-13 DIAGNOSIS — M80.08XA AGE-RELATED OSTEOPOROSIS WITH CURRENT PATHOLOGICAL FRACTURE, VERTEBRA(E), INITIAL ENCOUNTER FOR FRACTURE: Primary | ICD-10-CM

## 2023-02-13 PROCEDURE — 99999 PR PBB SHADOW E&M-EST. PATIENT-LVL III: CPT | Mod: PBBFAC,,, | Performed by: ANESTHESIOLOGY

## 2023-02-13 PROCEDURE — 99213 OFFICE O/P EST LOW 20 MIN: CPT | Mod: PBBFAC,PN | Performed by: ANESTHESIOLOGY

## 2023-02-13 PROCEDURE — 99204 OFFICE O/P NEW MOD 45 MIN: CPT | Mod: S$PBB,,, | Performed by: ANESTHESIOLOGY

## 2023-02-13 PROCEDURE — 99204 PR OFFICE/OUTPT VISIT, NEW, LEVL IV, 45-59 MIN: ICD-10-PCS | Mod: S$PBB,,, | Performed by: ANESTHESIOLOGY

## 2023-02-13 PROCEDURE — 99999 PR PBB SHADOW E&M-EST. PATIENT-LVL III: ICD-10-PCS | Mod: PBBFAC,,, | Performed by: ANESTHESIOLOGY

## 2023-02-13 NOTE — PROGRESS NOTES
This note was completed with dictation software and grammatical errors may exist.    Referring Physician: Liliana Nick    PCP: Dariel Amin MD      CC:  Mid to lower back pain    HPI:   Yesy Knight is a 77 y.o. female presents with mid to lower back pain.  Patient with longstanding history of lower back pain and scoliosis.  She states having exacerbation of lower back pain 3 weeks ago.  She lifted heavy gate her property and felt an increase in her lower back pain.  Pain is a constant aching, throbbing pain in her mid to lower back.  Pain does not radiate down her lower extremities.  Pain worsens standing, walking getting up.  Heat provides help the pain.  She takes acetaminophen and ibuprofen with mild benefits.  She recently had a MRI of the lumbar spine which shows an acute T12 compression fracture.  She denies any worsening weakness.  No bowel bladder changes.    ROS:  CONSTITUTIONAL: No fevers, chills, night sweats, wt. loss, appetite changes  SKIN: no rashes or itching  ENT: No headaches, head trauma, vision changes, or eye pain  LYMPH NODES: None noticed   CV: No chest pain, palpitations.   RESP: No shortness of breath, dyspnea on exertion, cough, wheezing, or hemoptysis  GI: No nausea, emesis, diarrhea, constipation, melena, hematochezia, pain.    : No dysuria, hematuria, urgency, or frequency   HEME: No easy bruising, bleeding problems  PSYCHIATRIC: No depression, anxiety, psychosis, hallucinations.  NEURO: No seizures, memory loss, dizziness or difficulty sleeping  MSK: +HPI      Past Medical History:   Diagnosis Date    Hypertension     Wears partial dentures     bottom partials     Past Surgical History:   Procedure Laterality Date    COLPORRHAPHY N/A 02/21/2019    Procedure: COLPORRHAPHY;  Surgeon: Rafael Guevara MD;  Location: [facility] OR;  Service: OB/GYN;  Laterality: N/A;    CYSTOSCOPY N/A 02/21/2019    Procedure: CYSTOSCOPY;  Surgeon: Rafael Guevara MD;  Location: [facility] OR;  Service:  "OB/GYN;  Laterality: N/A;    HYSTERECTOMY      MOUTH SURGERY  03/2018     second one 10/2018    SACROSPINOUS LIGAMENT FIXATION  02/21/2019    Procedure: FIXATION, LIGAMENT, SACROSPINOUS;  Surgeon: Rafael Guevara MD;  Location: [facility] OR;  Service: OB/GYN;;    STOMACH SURGERY      TOTAL VAGINAL HYSTERECTOMY N/A 02/21/2019    Procedure: HYSTERECTOMY, TOTAL, VAGINAL;  Surgeon: Rafael Guevara MD;  Location: [facility] OR;  Service: OB/GYN;  Laterality: N/A;     History reviewed. No pertinent family history.  Social History     Socioeconomic History    Marital status:    Tobacco Use    Smoking status: Never    Smokeless tobacco: Never   Substance and Sexual Activity    Alcohol use: Yes     Alcohol/week: 2.0 standard drinks     Types: 2 Glasses of wine per week    Drug use: No    Sexual activity: Not Currently         Medications/Allergies: See med card    Vitals:    02/13/23 0816   BP: (!) 161/95   Pulse: 84   Weight: 67.6 kg (149 lb 0.5 oz)   Height: 5' 5" (1.651 m)   PainSc:   8   PainLoc: Back         Physical exam:    GENERAL: A and O x3, the patient appears well groomed and is in no acute distress.  Skin: No rashes or obvious lesions  HEENT: normocephalic, atraumatic  CARDIOVASCULAR:  Palpable peripheral pulses  LUNGS: easy work of breathing  ABDOMEN: soft, nontender   UPPER EXTREMITIES: Normal alignment, normal range of motion, no atrophy, no skin changes,  hair growth and nail growth normal and equal bilaterally. No swelling, no tenderness.    LOWER EXTREMITIES:  Normal alignment, normal range of motion, no atrophy, no skin changes,  hair growth and nail growth normal and equal bilaterally. No swelling, no tenderness.    LUMBAR SPINE  Lumbar spine: ROM is full with flexion extension and oblique extension with no increased pain.    Jm's test causes no increased pain on either side.    Supine straight leg raise is negative bilaterally.    Internal and external rotation of the hip causes no increased pain on " either side.  Myofascial exam: No tenderness to palpation across lumbar paraspinous muscles.    MENTAL STATUS: normal orientation, speech, language, and fund of knowledge for social situation.  Emotional state appropriate.    MOTOR: Tone and bulk: normal bilateral upper and lower Strength: normal     SENSATION: Light touch and pinprick intact bilaterally  REFLEXES: normal, symmetric, nonbrisk.  Toes down, no clonus. No hoffmans.  GAIT: normal rise, base, steps, and arm swing.        Imaging:  MRI L-spine 1/2023     There is prominent levoscoliosis. There is a recent mild compression deformity involving superior endplate of T12. There is associated marrow edema which parallels the superior endplate. There appears to be extension of the fracture line extending obliquely through the inferior aspect of the T12 vertebra towards the left of midline. There is an slight retropulsion of the posterosuperior vertebral margin.     Vertebral body heights appear otherwise appropriately maintained. Alignment is satisfactory.     Degenerative loss of disc signal occurs at each of the lumbar levels. There are scattered areas of rounded increased T1 marrow signal compatible with vertebral body hemangiomas, most notably within the L4 vertebral body. Degenerative marrow signal changes are observed about the L1, L2 and L5 disc spaces. Degenerative loss of disc signal and disc space narrowing are observed at each of the lumbar levels.     At L1-L2, disc bulging and posterior osteophytic ridging are asymmetric towards the right of midline. There is a mild to moderate degree of mass effect upon the thecal sac. There are mild facet degenerative changes contributing to a mild degree of encroachment of the central canal. There is moderate right foraminal stenosis.     At L2-3, disc bulging and posterior osteophytic ridging are asymmetric towards the right of midline. There are mild facet degenerative changes contributing to mild central canal  encroachment. There is moderate right foraminal narrowing.     At L3-4, protrusion of the disc margin is observed in the left paracentral and far left lateral canal with asymmetric mass effect upon the thecal sac. Combining with facet degenerative changes there is a mild-moderate degree of central canal narrowing. There is mild to moderate right foraminal and mild left foraminal encroachment.     At L4-5, broad-based bulging of the disc margin and facet arthropathy results in mild to moderate encroachment of the central spinal canal. There is left lateral recess narrowing. There is mild to moderate left foraminal encroachment.     At L5-S1, bulging of the disc margin is asymmetric towards the left of midline. Facet degenerative changes contribute to a mild degree of narrowing of the central spinal canal. There is moderate left foraminal narrowing.     The conus terminates appropriately and demonstrates normal signal. The paraspinal soft tissues appear unremarkable    Assessment:  Patient presents with lower back pain  1. Age-related osteoporosis with current pathological fracture, vertebra(e), initial encounter for fracture    2. DDD (degenerative disc disease), lumbar    3. Other spondylosis, lumbar region          Plan:  I have stressed the importance of physical activity and exercise to improve overall health  Reviewed pertinent imaging and records with patient  Patient with acute T12 compression fracture.  Discuss overall progression and treatment of compression fractures.  We discussed treatment options such as conservative therapy, epidural steroid injections, vertebral cement augmentation.  She wishes to try ALMA for symptomatic relief.  Follow up after procedure    Thank you for referring this interesting patient, and I look forward to continuing to collaborate in her care.

## 2023-02-13 NOTE — TELEPHONE ENCOUNTER
Order Date:2/13/2023   Ordering User:   BLADIMIR ROBERTSON [[id number]]   Encounter Provider:Bladimir Robertson MD [[id number]]   Authorizing Provider: Bladimir Robertson MD [[id number]]   Department:[facility] PAIN MANAGEMENT[[id number]]      Common Order Information   Procedure -> Epidural Injection (specify level) Cmt: T12-L1      Order Specific Information   Order: Procedure Order to Pain Management [Custom: [id number]]  Order #:           [id number]: 1 FUTURE     Priority: Routine  Class:    NClinic Performed     Future Order Information       Expires on:02/13/2024            Expected by:02/13/2023                    Associated Diagnoses       M80.08XA Age-related osteoporosis with current pathological fracture,        vertebra(e), initial encounter for fracture       M51.36 DDD (degenerative disc disease), lumbar       Facility Name: -> [facility]               Priority: Routine  Class: Clinic Performed     Fu   ture Order Information       Expires on:02/13/2024            Expected by:02/13/2023                    Associated Diagnoses       M80.08XA Age-related osteoporosis with current pathological fracture,        vertebra(e), initial encounter for fracture       M51.36 DDD (degenerative disc disease), lumbar       Procedure -> Epidural Injection (specify level) Cmt: T12-L1          Facility Name: -> [facility]     No answer, no vm available

## 2023-02-22 ENCOUNTER — TELEPHONE (OUTPATIENT)
Dept: PAIN MEDICINE | Facility: CLINIC | Age: 78
End: 2023-02-22
Payer: MEDICARE

## 2023-02-22 NOTE — TELEPHONE ENCOUNTER
----- Message from Stacey M Lefort sent at 2/22/2023  2:32 PM CST -----  Pt is returning your call at [phone]. Thank you.

## 2023-02-27 ENCOUNTER — TELEPHONE (OUTPATIENT)
Dept: CARDIOLOGY | Facility: CLINIC | Age: 78
End: 2023-02-27
Payer: MEDICARE

## 2023-02-27 NOTE — TELEPHONE ENCOUNTER
----- Message from Renetta Malhotra sent at 2/27/2023  2:37 PM CST -----  Type:  Patient Returning Call    Who Called:  pt  Who Left Message for Patient:  Kelsey  Does the patient know what this is regarding?:  no  Best Call Back Number:   [phone]    Additional Information:  please call and advise--thank you

## 2023-02-27 NOTE — TELEPHONE ENCOUNTER
----- Message from Tomás Stanley sent at 2/27/2023  1:20 PM CST -----  Regarding: labs  Type: Needs Medical Advice    Who Called:  Yesy Hirsch Call Back Number: [phone]    Additional Information: pt wants to know if needs labs before upcoming appt. Please call to discuss.

## 2023-02-28 ENCOUNTER — TELEPHONE (OUTPATIENT)
Dept: PAIN MEDICINE | Facility: CLINIC | Age: 78
End: 2023-02-28
Payer: MEDICARE

## 2023-02-28 NOTE — TELEPHONE ENCOUNTER
----- Message from Stacey M Lefort sent at 2/27/2023  3:49 PM CST -----  Pt is requesting to speak with you and can be reached at [phone]. Thank you.

## 2023-03-02 ENCOUNTER — OFFICE VISIT (OUTPATIENT)
Dept: CARDIOLOGY | Facility: CLINIC | Age: 78
End: 2023-03-02
Payer: MEDICARE

## 2023-03-02 VITALS
DIASTOLIC BLOOD PRESSURE: 98 MMHG | HEART RATE: 78 BPM | WEIGHT: 145.5 LBS | HEIGHT: 65 IN | SYSTOLIC BLOOD PRESSURE: 142 MMHG | BODY MASS INDEX: 24.24 KG/M2

## 2023-03-02 DIAGNOSIS — R07.9 CHEST PAIN, UNSPECIFIED TYPE: Primary | ICD-10-CM

## 2023-03-02 PROCEDURE — 99214 PR OFFICE/OUTPT VISIT, EST, LEVL IV, 30-39 MIN: ICD-10-PCS | Mod: S$PBB,,, | Performed by: INTERNAL MEDICINE

## 2023-03-02 PROCEDURE — 93005 ELECTROCARDIOGRAM TRACING: CPT | Mod: PBBFAC,PN | Performed by: INTERNAL MEDICINE

## 2023-03-02 PROCEDURE — 93010 EKG 12-LEAD: ICD-10-PCS | Mod: S$PBB,,, | Performed by: INTERNAL MEDICINE

## 2023-03-02 PROCEDURE — 93010 ELECTROCARDIOGRAM REPORT: CPT | Mod: S$PBB,,, | Performed by: INTERNAL MEDICINE

## 2023-03-02 PROCEDURE — 99999 PR PBB SHADOW E&M-EST. PATIENT-LVL III: CPT | Mod: PBBFAC,,, | Performed by: INTERNAL MEDICINE

## 2023-03-02 PROCEDURE — 99213 OFFICE O/P EST LOW 20 MIN: CPT | Mod: PBBFAC,PN | Performed by: INTERNAL MEDICINE

## 2023-03-02 PROCEDURE — 99214 OFFICE O/P EST MOD 30 MIN: CPT | Mod: S$PBB,,, | Performed by: INTERNAL MEDICINE

## 2023-03-02 PROCEDURE — 99999 PR PBB SHADOW E&M-EST. PATIENT-LVL III: ICD-10-PCS | Mod: PBBFAC,,, | Performed by: INTERNAL MEDICINE

## 2023-03-02 NOTE — PROGRESS NOTES
Subjective:    Patient ID:  Yesy Knight is a 77 y.o. female patient here for evaluation Hypertension      History of Present Illness:   77-year-old female with past medical history of hypertension chronic back pain came here for establishment of care.  Patient complains of intermittent sharp chest pains which lasts for few minutes and resolve on their own with no aggravating or relieving factors, unrelated to exertion, no associated symptoms.  Symptoms happen once a week.  Denies any exertional chest pain at baseline.  Patient states that her blood pressure is elevated because of her chronic back pain.  Sometimes she has normal blood pressure at home.  Compliant with medications.  She stopped Crestor last year because of myalgias.          Review of patient's allergies indicates:  No Known Allergies    Past Medical History:   Diagnosis Date    Hypertension     Wears partial dentures     bottom partials     Past Surgical History:   Procedure Laterality Date    COLPORRHAPHY N/A 02/21/2019    Procedure: COLPORRHAPHY;  Surgeon: Rafael Guevara MD;  Location: [facility] OR;  Service: OB/GYN;  Laterality: N/A;    CYSTOSCOPY N/A 02/21/2019    Procedure: CYSTOSCOPY;  Surgeon: Rafael Guevara MD;  Location: [facility] OR;  Service: OB/GYN;  Laterality: N/A;    HYSTERECTOMY      MOUTH SURGERY  03/2018     second one 10/2018    SACROSPINOUS LIGAMENT FIXATION  02/21/2019    Procedure: FIXATION, LIGAMENT, SACROSPINOUS;  Surgeon: Rafael Guevara MD;  Location: [facility] OR;  Service: OB/GYN;;    STOMACH SURGERY      TOTAL VAGINAL HYSTERECTOMY N/A 02/21/2019    Procedure: HYSTERECTOMY, TOTAL, VAGINAL;  Surgeon: Rafael Guevara MD;  Location: [facility] OR;  Service: OB/GYN;  Laterality: N/A;     Social History     Tobacco Use    Smoking status: Never    Smokeless tobacco: Never   Substance Use Topics    Alcohol use: Yes     Alcohol/week: 2.0 standard drinks     Types: 2 Glasses of wine per week    Drug use: No        Review of Systems    Negative except as mentioned in HPI         Objective        Vitals:    03/02/23 1442   BP: (!) 142/98   Pulse: 78       LIPIDS - LAST 2   Lab Results   Component Value Date    CHOL 168 06/30/2022    CHOL 159 06/25/2021    HDL 72 06/30/2022    HDL 53 06/25/2021    LDLCALC 87.8 06/30/2022    LDLCALC 84.8 06/25/2021    TRIG 41 06/30/2022    TRIG 106 06/25/2021    CHOLHDL 42.9 06/30/2022    CHOLHDL 33.3 06/25/2021       CBC - LAST 2  Lab Results   Component Value Date    WBC 6.94 06/25/2021    WBC 6.67 01/15/2021    RBC 4.28 06/25/2021    RBC 4.50 01/15/2021    HGB 13.8 06/25/2021    HGB 14.7 01/15/2021    HCT 40.9 06/25/2021    HCT 44.1 01/15/2021    MCV 96 06/25/2021    MCV 98 01/15/2021    MCH 32.2 (H) 06/25/2021    MCH 32.7 (H) 01/15/2021    MCHC 33.7 06/25/2021    MCHC 33.3 01/15/2021    RDW 12.0 06/25/2021    RDW 12.3 01/15/2021     06/25/2021     01/15/2021    MPV 10.7 06/25/2021    MPV 10.4 01/15/2021    GRAN 4.6 06/25/2021    GRAN 66.0 06/25/2021    LYMPH 1.7 06/25/2021    LYMPH 24.5 06/25/2021    MONO 0.5 06/25/2021    MONO 6.9 06/25/2021    BASO 0.04 06/25/2021    BASO 0.05 01/15/2021    NRBC 0 06/25/2021    NRBC 0 01/15/2021       CHEMISTRY & LIVER FUNCTION - LAST 2  Lab Results   Component Value Date     06/30/2022     11/16/2021    K 3.9 06/30/2022    K 4.4 11/16/2021     06/30/2022     11/16/2021    CO2 28 06/30/2022    CO2 28 11/16/2021    ANIONGAP 7 (L) 06/30/2022    ANIONGAP 6 (L) 11/16/2021    BUN 29 (H) 06/30/2022    BUN 20 11/16/2021    CREATININE 0.8 06/30/2022    CREATININE 0.8 11/16/2021    GLU 94 06/30/2022     11/16/2021    CALCIUM 9.3 06/30/2022    CALCIUM 9.4 11/16/2021    MG 1.9 01/15/2021    ALBUMIN 4.1 01/15/2021    ALBUMIN 4.1 08/06/2020    PROT 7.1 01/15/2021    PROT 6.6 08/06/2020    ALKPHOS 61 01/15/2021    ALKPHOS 55 08/06/2020    ALT 29 01/15/2021    ALT 26 08/06/2020    AST 26 01/15/2021    AST 24 08/06/2020    BILITOT 1.2 (H)  01/15/2021    BILITOT 0.7 08/06/2020        CARDIAC PROFILE - LAST 2  No results found for: BNP, CPK, CPKMB, LDH, TROPONINI, TROPONINIHS     COAGULATION - LAST 2  No results found for: LABPT, INR, APTT    ENDOCRINE & PSA - LAST 2  Lab Results   Component Value Date    TSH 4.330 09/23/2022    TSH 4.870 11/16/2021        ECHOCARDIOGRAM RESULTS  Results for orders placed during the hospital encounter of 06/28/21    Echo    Interpretation Summary  · The left ventricle is normal in size with mild concentric hypertrophy and normal systolic function.  · The estimated ejection fraction is 79%.  · Normal left ventricular diastolic function.  · Atrial fibrillation not observed.  · Normal right ventricular size with normal right ventricular systolic function.  · Normal central venous pressure (3 mmHg).  · The estimated PA systolic pressure is 28 mmHg.      CURRENT/PREVIOUS VISIT EKG  Results for orders placed or performed in visit on 02/09/21   IN OFFICE EKG 12-LEAD (to [place])    Collection Time: 02/09/21  9:52 AM    Narrative    Test Reason : I10,    Vent. Rate : 063 BPM     Atrial Rate : 063 BPM     P-R Int : 186 ms          QRS Dur : 078 ms      QT Int : 388 ms       P-R-T Axes : 007 018 025 degrees     QTc Int : 397 ms    Normal sinus rhythm  Septal infarct ,age undetermined  Abnormal ECG  No previous ECGs available  Confirmed by Leticia Mcginnis MD ([id number]) on 2/14/2021 6:05:27 PM    Referred By: JOSE   SELF           Confirmed By:Leticia Mcginnis MD     No valid procedures specified.   No results found for this or any previous visit.    No valid procedures specified.          PREVIOUS STRESS TEST              PREVIOUS ANGIOGRAM        PHYSICAL EXAM    CONSTITUTIONAL: Well built, well nourished in no apparent distress  HEENT: No pallor  NECK: no JVD  LUNGS: CTA b/l  HEART: regular rate and rhythm, S1, S2 normal, no murmur   ABDOMEN: soft, non-tender; bowel sounds normal  EXTREMITIES: No edema  NEURO: AAO X 3    SKIN:  No rash  Psych:  Normal affect    I HAVE REVIEWED :    The vital signs, nurses notes, and all the pertinent radiology and labs.        Current Outpatient Medications   Medication Instructions    cholecalciferol, vitamin D3, (VITAMIN D3 ORAL) Oral    HYDROcodone-acetaminophen (NORCO) 7.5-325 mg per tablet No dose, route, or frequency recorded.    ibuprofen (ADVIL,MOTRIN) 600 MG tablet No dose, route, or frequency recorded.    levothyroxine (SYNTHROID) 25 MCG tablet TAKE (1) TABLET BY MOUTH ONCE DAILY.    ramipriL (ALTACE) 2.5 mg, Oral, Daily        ECG reviewed by me:  Normal sinus rhythm  Assessment & Plan      77-year-old female with past medical history of hypertension chronic back pain came here for establishment of care.  Patient complains of intermittent sharp chest pains which lasts for few minutes and resolve on their own with no aggravating or relieving factors, unrelated to exertion, no associated symptoms.  Symptoms happen once a week.  Denies any exertional chest pain at baseline.  Patient states that her blood pressure is elevated because of her chronic back pain.  Sometimes she has normal blood pressure at home.  Compliant with medications.  She stopped Crestor last year because of myalgias.  Atypical chest pain episodes  -echo and exercise nuclear stress test for further evaluation  -continue ramipril. home BP monitoring and return the diary during next visit  -check lipid profile  -advised patient to go to the ER in case of recurrent/worsening symptoms        Follow up in about 4 weeks (around 3/30/2023).

## 2023-03-17 ENCOUNTER — LAB VISIT (OUTPATIENT)
Dept: LAB | Facility: HOSPITAL | Age: 78
End: 2023-03-17
Attending: INTERNAL MEDICINE
Payer: MEDICARE

## 2023-03-17 DIAGNOSIS — Z12.31 ENCOUNTER FOR SCREENING MAMMOGRAM FOR BREAST CANCER: Primary | ICD-10-CM

## 2023-03-17 DIAGNOSIS — R07.9 CHEST PAIN, UNSPECIFIED TYPE: ICD-10-CM

## 2023-03-17 LAB
CHOLEST SERPL-MCNC: 249 MG/DL (ref 120–199)
CHOLEST/HDLC SERPL: 3.9 {RATIO} (ref 2–5)
HDLC SERPL-MCNC: 64 MG/DL (ref 40–75)
HDLC SERPL: 25.7 % (ref 20–50)
LDLC SERPL CALC-MCNC: 175.6 MG/DL (ref 63–159)
NONHDLC SERPL-MCNC: 185 MG/DL
TRIGL SERPL-MCNC: 47 MG/DL (ref 30–150)

## 2023-03-17 PROCEDURE — 36415 COLL VENOUS BLD VENIPUNCTURE: CPT | Performed by: INTERNAL MEDICINE

## 2023-03-17 PROCEDURE — 80061 LIPID PANEL: CPT | Performed by: INTERNAL MEDICINE

## 2023-04-05 ENCOUNTER — TELEPHONE (OUTPATIENT)
Dept: CARDIOLOGY | Facility: HOSPITAL | Age: 78
End: 2023-04-05

## 2023-04-05 NOTE — TELEPHONE ENCOUNTER
Patient advised, test will be at [facility] ([address]   Will need to register on the first floor at the main entrance.   Patient advised that arrival time is 6:50AM.  Patient advised that SHE may be here about 3.5-4 hours, and may want to bring something to occupy their time, as there will be periods of waiting.    Patient advised, may take HER medications prior to testing if you need to.  Advised if SHE needs to eat to take HER medications, please keep it light, like toast and juice.    Patient advised to avoid all caffeine 12 hours prior to testing.  This includes decaf tea and coffee.    Will provide peanut butter crackers for a snack after stress test.  If patient would prefer something else, please bring a snack from home.    Wear comfortable clothing.   No lotions, oils, or powders to the upper chest area. May wear deodorant.    No metal jewelry, buttons, or zippers to the upper body.  Patient verbalizes understanding of instructions.

## 2023-04-06 ENCOUNTER — HOSPITAL ENCOUNTER (OUTPATIENT)
Dept: RADIOLOGY | Facility: HOSPITAL | Age: 78
Discharge: HOME | End: 2023-04-06
Attending: INTERNAL MEDICINE
Payer: MEDICARE

## 2023-04-06 ENCOUNTER — HOSPITAL ENCOUNTER (OUTPATIENT)
Dept: CARDIOLOGY | Facility: HOSPITAL | Age: 78
Discharge: HOME | End: 2023-04-06
Attending: INTERNAL MEDICINE
Payer: MEDICARE

## 2023-04-06 VITALS — WEIGHT: 145 LBS | BODY MASS INDEX: 24.16 KG/M2 | HEIGHT: 65 IN

## 2023-04-06 DIAGNOSIS — R07.9 CHEST PAIN, UNSPECIFIED TYPE: ICD-10-CM

## 2023-04-06 LAB
AORTIC ROOT ANNULUS: 3 CM
AORTIC VALVE CUSP SEPERATION: 1.7 CM
AV INDEX (PROSTH): 0.83
AV MEAN GRADIENT: 4 MMHG
AV PEAK GRADIENT: 7 MMHG
AV REGURGITATION PRESSURE HALF TIME: 642 MS
AV VALVE AREA: 2.62 CM2
AV VELOCITY RATIO: 0.81
BSA FOR ECHO PROCEDURE: 1.74 M2
CV ECHO LV RWT: 0.62 CM
DOP CALC AO PEAK VEL: 1.3 M/S
DOP CALC AO VTI: 28.3 CM
DOP CALC LVOT AREA: 3.1 CM2
DOP CALC LVOT DIAMETER: 2 CM
DOP CALC LVOT PEAK VEL: 1.05 M/S
DOP CALC LVOT STROKE VOLUME: 74.1 CM3
DOP CALCLVOT PEAK VEL VTI: 23.6 CM
E WAVE DECELERATION TIME: 253 MSEC
E/A RATIO: 0.61
E/E' RATIO: 8.71 M/S
ECHO LV POSTERIOR WALL: 1.31 CM (ref 0.6–1.1)
EJECTION FRACTION: 65 %
FRACTIONAL SHORTENING: 33 % (ref 28–44)
INTERVENTRICULAR SEPTUM: 1.3 CM (ref 0.6–1.1)
LEFT ATRIUM SIZE: 3.9 CM
LEFT INTERNAL DIMENSION IN SYSTOLE: 2.84 CM (ref 2.1–4)
LEFT VENTRICLE DIASTOLIC VOLUME INDEX: 46.47 ML/M2
LEFT VENTRICLE DIASTOLIC VOLUME: 80.4 ML
LEFT VENTRICLE MASS INDEX: 118 G/M2
LEFT VENTRICLE SYSTOLIC VOLUME INDEX: 17.7 ML/M2
LEFT VENTRICLE SYSTOLIC VOLUME: 30.6 ML
LEFT VENTRICULAR INTERNAL DIMENSION IN DIASTOLE: 4.24 CM (ref 3.5–6)
LEFT VENTRICULAR MASS: 204.6 G
LV LATERAL E/E' RATIO: 6.73 M/S
LV SEPTAL E/E' RATIO: 12.33 M/S
LVOT MG: 3 MMHG
LVOT MV: 0.74 CM/S
MV PEAK A VEL: 1.21 M/S
MV PEAK E VEL: 0.74 M/S
MV STENOSIS PRESSURE HALF TIME: 65 MS
MV VALVE AREA P 1/2 METHOD: 3.38 CM2
PISA AR MAX VEL: 3.08 M/S
PISA TR MAX VEL: 2.29 M/S
RA PRESSURE: 3 MMHG
RIGHT VENTRICULAR END-DIASTOLIC DIMENSION: 2.48 CM
TDI LATERAL: 0.11 M/S
TDI SEPTAL: 0.06 M/S
TDI: 0.09 M/S
TR MAX PG: 21 MMHG
TV REST PULMONARY ARTERY PRESSURE: 24 MMHG

## 2023-04-06 PROCEDURE — 93306 TTE W/DOPPLER COMPLETE: CPT

## 2023-04-06 PROCEDURE — 93306 TTE W/DOPPLER COMPLETE: CPT | Mod: 26,,, | Performed by: GENERAL PRACTICE

## 2023-04-06 PROCEDURE — 93306 ECHO (CUPID ONLY): ICD-10-PCS | Mod: 26,,, | Performed by: GENERAL PRACTICE

## 2023-04-06 PROCEDURE — A9502 TC99M TETROFOSMIN: HCPCS

## 2023-04-06 PROCEDURE — 78452 HT MUSCLE IMAGE SPECT MULT: CPT

## 2023-04-06 PROCEDURE — 93018 CV STRESS TEST I&R ONLY: CPT | Mod: ,,, | Performed by: INTERNAL MEDICINE

## 2023-04-06 PROCEDURE — 78452 NUCLEAR STRESS - CARDIOLOGY INTERPRETED (CUPID ONLY): ICD-10-PCS | Mod: 26,,, | Performed by: INTERNAL MEDICINE

## 2023-04-06 PROCEDURE — 93016 NUCLEAR STRESS - CARDIOLOGY INTERPRETED (CUPID ONLY): ICD-10-PCS | Mod: ,,, | Performed by: NURSE PRACTITIONER

## 2023-04-06 PROCEDURE — 93016 CV STRESS TEST SUPVJ ONLY: CPT | Mod: ,,, | Performed by: NURSE PRACTITIONER

## 2023-04-06 PROCEDURE — 93018 NUCLEAR STRESS - CARDIOLOGY INTERPRETED (CUPID ONLY): ICD-10-PCS | Mod: ,,, | Performed by: INTERNAL MEDICINE

## 2023-04-06 PROCEDURE — 78452 HT MUSCLE IMAGE SPECT MULT: CPT | Mod: 26,,, | Performed by: INTERNAL MEDICINE

## 2023-04-11 ENCOUNTER — HOSPITAL ENCOUNTER (OUTPATIENT)
Dept: RADIOLOGY | Facility: HOSPITAL | Age: 78
Discharge: HOME | End: 2023-04-11
Attending: SPECIALIST
Payer: MEDICARE

## 2023-04-11 DIAGNOSIS — Z12.31 ENCOUNTER FOR SCREENING MAMMOGRAM FOR BREAST CANCER: ICD-10-CM

## 2023-04-11 PROCEDURE — 77067 SCR MAMMO BI INCL CAD: CPT | Mod: TC,PO

## 2023-04-14 LAB
CV STRESS BASE HR: 75 BPM
DIASTOLIC BLOOD PRESSURE: 92 MMHG
EJECTION FRACTION- HIGH: 65 %
END DIASTOLIC INDEX-HIGH: 153 ML/M2
END DIASTOLIC INDEX-LOW: 93 ML/M2
END SYSTOLIC INDEX-HIGH: 71 ML/M2
END SYSTOLIC INDEX-LOW: 31 ML/M2
NUC REST DIASTOLIC VOLUME INDEX: 48
NUC REST EJECTION FRACTION: 96
NUC REST SYSTOLIC VOLUME INDEX: 2
NUC STRESS DIASTOLIC VOLUME INDEX: 44
NUC STRESS EJECTION FRACTION: 89 %
NUC STRESS SYSTOLIC VOLUME INDEX: 5
OHS CV CPX 1 MINUTE RECOVERY HEART RATE: 118 BPM
OHS CV CPX 85 PERCENT MAX PREDICTED HEART RATE MALE: 117
OHS CV CPX ESTIMATED METS: 7
OHS CV CPX MAX PREDICTED HEART RATE: 137
OHS CV CPX PATIENT IS FEMALE: 1
OHS CV CPX PATIENT IS MALE: 0
OHS CV CPX PEAK DIASTOLIC BLOOD PRESSURE: 98 MMHG
OHS CV CPX PEAK HEAR RATE: 134 BPM
OHS CV CPX PEAK RATE PRESSURE PRODUCT: NORMAL
OHS CV CPX PEAK SYSTOLIC BLOOD PRESSURE: 178 MMHG
OHS CV CPX PERCENT MAX PREDICTED HEART RATE ACHIEVED: 98
OHS CV CPX RATE PRESSURE PRODUCT PRESENTING: NORMAL
RETIRED EF AND QEF - SEE NOTES: 53 %
STRESS ECHO POST EXERCISE DUR MIN: 4 MINUTES
STRESS ECHO POST EXERCISE DUR SEC: 32 SECONDS
SYSTOLIC BLOOD PRESSURE: 158 MMHG

## 2023-04-17 ENCOUNTER — OFFICE VISIT (OUTPATIENT)
Dept: CARDIOLOGY | Facility: CLINIC | Age: 78
End: 2023-04-17
Payer: MEDICARE

## 2023-04-17 VITALS
HEART RATE: 80 BPM | DIASTOLIC BLOOD PRESSURE: 100 MMHG | WEIGHT: 147.69 LBS | SYSTOLIC BLOOD PRESSURE: 140 MMHG | BODY MASS INDEX: 24.61 KG/M2 | HEIGHT: 65 IN

## 2023-04-17 DIAGNOSIS — R94.39 ABNORMAL STRESS TEST: Primary | ICD-10-CM

## 2023-04-17 PROCEDURE — 99215 OFFICE O/P EST HI 40 MIN: CPT | Mod: S$PBB,,, | Performed by: INTERNAL MEDICINE

## 2023-04-17 PROCEDURE — 99215 PR OFFICE/OUTPT VISIT, EST, LEVL V, 40-54 MIN: ICD-10-PCS | Mod: S$PBB,,, | Performed by: INTERNAL MEDICINE

## 2023-04-17 PROCEDURE — 99999 PR PBB SHADOW E&M-EST. PATIENT-LVL III: ICD-10-PCS | Mod: PBBFAC,,, | Performed by: INTERNAL MEDICINE

## 2023-04-17 PROCEDURE — 99999 PR PBB SHADOW E&M-EST. PATIENT-LVL III: CPT | Mod: PBBFAC,,, | Performed by: INTERNAL MEDICINE

## 2023-04-17 PROCEDURE — 99213 OFFICE O/P EST LOW 20 MIN: CPT | Mod: PBBFAC,PN | Performed by: INTERNAL MEDICINE

## 2023-04-17 RX ORDER — ROSUVASTATIN CALCIUM 20 MG/1
20 TABLET, COATED ORAL NIGHTLY
Qty: 90 TABLET | Refills: 1 | Status: SHIPPED | OUTPATIENT
Start: 2023-04-17 | End: 2023-07-25 | Stop reason: SDUPTHER

## 2023-04-17 RX ORDER — METOPROLOL TARTRATE 50 MG/1
TABLET ORAL
Qty: 2 TABLET | Refills: 0 | Status: SHIPPED | OUTPATIENT
Start: 2023-04-17 | End: 2023-05-04

## 2023-04-17 NOTE — PROGRESS NOTES
Subjective:    Patient ID:  Yesy Knight is a 78 y.o. female patient here for evaluation Results (ECHO and Stress) and Hypertension      Follow up visit 04/17/2023:  Chest pains resolved.  Patient is completely asymptomatic currently.  Underwent nuclear stress test.  Compliant with the medications.      History of Present Illness during initial visit:   77-year-old female with past medical history of hypertension chronic back pain came here for establishment of care.  Patient complains of intermittent sharp chest pains which lasts for few minutes and resolve on their own with no aggravating or relieving factors, unrelated to exertion, no associated symptoms.  Symptoms happen once a week.  Denies any exertional chest pain at baseline.  Patient states that her blood pressure is elevated because of her chronic back pain.  Sometimes she has normal blood pressure at home.  Compliant with medications.  She stopped Crestor last year because of myalgias.          Review of patient's allergies indicates:  No Known Allergies    Past Medical History:   Diagnosis Date    Hypertension     Wears partial dentures     bottom partials     Past Surgical History:   Procedure Laterality Date    COLPORRHAPHY N/A 02/21/2019    Procedure: COLPORRHAPHY;  Surgeon: Rafael Guevara MD;  Location: [facility] OR;  Service: OB/GYN;  Laterality: N/A;    CYSTOSCOPY N/A 02/21/2019    Procedure: CYSTOSCOPY;  Surgeon: Rafael Guevara MD;  Location: [facility] OR;  Service: OB/GYN;  Laterality: N/A;    HYSTERECTOMY      MOUTH SURGERY  03/2018     second one 10/2018    SACROSPINOUS LIGAMENT FIXATION  02/21/2019    Procedure: FIXATION, LIGAMENT, SACROSPINOUS;  Surgeon: Rafael Guevara MD;  Location: [facility] OR;  Service: OB/GYN;;    STOMACH SURGERY      TOTAL VAGINAL HYSTERECTOMY N/A 02/21/2019    Procedure: HYSTERECTOMY, TOTAL, VAGINAL;  Surgeon: Rafael Guevara MD;  Location: [facility] OR;  Service: OB/GYN;  Laterality: N/A;     Social History     Tobacco Use     Smoking status: Never    Smokeless tobacco: Never   Substance Use Topics    Alcohol use: Yes     Alcohol/week: 2.0 standard drinks     Types: 2 Glasses of wine per week    Drug use: No        Review of Systems   Negative except as mentioned in HPI         Objective        Vitals:    04/17/23 0951   BP: (!) 140/100   Pulse: 80       LIPIDS - LAST 2   Lab Results   Component Value Date    CHOL 249 (H) 03/17/2023    CHOL 168 06/30/2022    HDL 64 03/17/2023    HDL 72 06/30/2022    LDLCALC 175.6 (H) 03/17/2023    LDLCALC 87.8 06/30/2022    TRIG 47 03/17/2023    TRIG 41 06/30/2022    CHOLHDL 25.7 03/17/2023    CHOLHDL 42.9 06/30/2022       CBC - LAST 2  Lab Results   Component Value Date    WBC 6.94 06/25/2021    WBC 6.67 01/15/2021    RBC 4.28 06/25/2021    RBC 4.50 01/15/2021    HGB 13.8 06/25/2021    HGB 14.7 01/15/2021    HCT 40.9 06/25/2021    HCT 44.1 01/15/2021    MCV 96 06/25/2021    MCV 98 01/15/2021    MCH 32.2 (H) 06/25/2021    MCH 32.7 (H) 01/15/2021    MCHC 33.7 06/25/2021    MCHC 33.3 01/15/2021    RDW 12.0 06/25/2021    RDW 12.3 01/15/2021     06/25/2021     01/15/2021    MPV 10.7 06/25/2021    MPV 10.4 01/15/2021    GRAN 4.6 06/25/2021    GRAN 66.0 06/25/2021    LYMPH 1.7 06/25/2021    LYMPH 24.5 06/25/2021    MONO 0.5 06/25/2021    MONO 6.9 06/25/2021    BASO 0.04 06/25/2021    BASO 0.05 01/15/2021    NRBC 0 06/25/2021    NRBC 0 01/15/2021       CHEMISTRY & LIVER FUNCTION - LAST 2  Lab Results   Component Value Date     06/30/2022     11/16/2021    K 3.9 06/30/2022    K 4.4 11/16/2021     06/30/2022     11/16/2021    CO2 28 06/30/2022    CO2 28 11/16/2021    ANIONGAP 7 (L) 06/30/2022    ANIONGAP 6 (L) 11/16/2021    BUN 29 (H) 06/30/2022    BUN 20 11/16/2021    CREATININE 0.8 06/30/2022    CREATININE 0.8 11/16/2021    GLU 94 06/30/2022     11/16/2021    CALCIUM 9.3 06/30/2022    CALCIUM 9.4 11/16/2021    MG 1.9 01/15/2021    ALBUMIN 4.1 01/15/2021    ALBUMIN 4.1  08/06/2020    PROT 7.1 01/15/2021    PROT 6.6 08/06/2020    ALKPHOS 61 01/15/2021    ALKPHOS 55 08/06/2020    ALT 29 01/15/2021    ALT 26 08/06/2020    AST 26 01/15/2021    AST 24 08/06/2020    BILITOT 1.2 (H) 01/15/2021    BILITOT 0.7 08/06/2020        CARDIAC PROFILE - LAST 2  No results found for: BNP, CPK, CPKMB, LDH, TROPONINI, TROPONINIHS     COAGULATION - LAST 2  No results found for: LABPT, INR, APTT    ENDOCRINE & PSA - LAST 2  Lab Results   Component Value Date    TSH 4.330 09/23/2022    TSH 4.870 11/16/2021        ECHOCARDIOGRAM RESULTS  Results for orders placed during the hospital encounter of 06/28/21    Echo    Interpretation Summary  · The left ventricle is normal in size with mild concentric hypertrophy and normal systolic function.  · The estimated ejection fraction is 79%.  · Normal left ventricular diastolic function.  · Atrial fibrillation not observed.  · Normal right ventricular size with normal right ventricular systolic function.  · Normal central venous pressure (3 mmHg).  · The estimated PA systolic pressure is 28 mmHg.      CURRENT/PREVIOUS VISIT EKG  Results for orders placed or performed in visit on 03/02/23   IN OFFICE EKG 12-LEAD (to [place])    Collection Time: 03/02/23  2:49 PM    Narrative    Test Reason : R07.9,    Vent. Rate : 079 BPM     Atrial Rate : 079 BPM     P-R Int : 174 ms          QRS Dur : 086 ms      QT Int : 354 ms       P-R-T Axes : 000 -12 056 degrees     QTc Int : 405 ms    Normal sinus rhythm  Cannot rule out Anterior infarct (cited on or before 09-FEB-2021)  Abnormal ECG  When compared with ECG of 09-FEB-2021 09:52,  Nonspecific T wave abnormality no longer evident in Inferior leads  Confirmed by Efraín Montero MD [id number]) on 3/20/2023 3:13:05 PM    Referred By:             Confirmed By:Efraín Montero MD     No valid procedures specified.   No results found for this or any previous visit.    No valid procedures specified.          PREVIOUS STRESS  TEST              PREVIOUS ANGIOGRAM        PHYSICAL EXAM    CONSTITUTIONAL: Well built, well nourished in no apparent distress  HEENT: No pallor  NECK: no JVD  LUNGS: CTA b/l  HEART: regular rate and rhythm, S1, S2 normal, no murmur   ABDOMEN: soft, non-tender; bowel sounds normal  EXTREMITIES: No edema  NEURO: AAO X 3   SKIN:  No rash  Psych:  Normal affect    I HAVE REVIEWED :    The vital signs, nurses notes, and all the pertinent radiology and labs.        Current Outpatient Medications   Medication Instructions    cholecalciferol, vitamin D3, (VITAMIN D3 ORAL) Oral    HYDROcodone-acetaminophen (NORCO) 7.5-325 mg per tablet No dose, route, or frequency recorded.    ibuprofen (ADVIL,MOTRIN) 600 MG tablet No dose, route, or frequency recorded.    levothyroxine (SYNTHROID) 25 MCG tablet TAKE (1) TABLET BY MOUTH ONCE DAILY.    metoprolol tartrate (LOPRESSOR) 50 MG tablet If heart rate >60 bpm:  take 50 mg tablet 1 hour prior to CTA.  Bring second tab with you to the procedure.    ramipriL (ALTACE) 2.5 mg, Oral, Daily    rosuvastatin (CRESTOR) 20 mg, Oral, Nightly          Assessment & Plan     04/17/2023  Chest pain resolved.  Patient is completely asymptomatic currently and is functionally very active at baseline and denies any exertional angina.   Nuclear stress test suggestive of LAD territory ischemia.  Discussed with the patient regarding coronary angiogram to rule out occlusive coronary artery disease.  However patient does not want to undergo invasive procedures at this point and would like to proceed with noninvasive testing.  Advised coronary CT angiogram.  Start aspirin 81 mg for now.   Hypertension-continue ramipril 2.5 mg. home BP log reviewed. blood pressure controlled at home.  Continue home BP monitoring  Dyslipidemia- patient recently restarted Crestor and tolerating well.  Continue Crestor 20 mg daily at bedtime.  Repeat lipid profile in 2-3 months    3/02/23   77-year-old female with past medical  history of hypertension chronic back pain came here for establishment of care.  Patient complains of intermittent sharp chest pains which lasts for few minutes and resolve on their own with no aggravating or relieving factors, unrelated to exertion, no associated symptoms.  Symptoms happen once a week.  Denies any exertional chest pain at baseline.  Patient states that her blood pressure is elevated because of her chronic back pain.  Sometimes she has normal blood pressure at home.  Compliant with medications.  She stopped Crestor last year because of myalgias.  Atypical chest pain episodes  -echo and exercise nuclear stress test for further evaluation  -continue ramipril. home BP monitoring and return the diary during next visit  -check lipid profile  -advised patient to go to the ER in case of recurrent/worsening symptoms        Follow up in about 2 months (around 6/17/2023).

## 2023-07-25 ENCOUNTER — OFFICE VISIT (OUTPATIENT)
Dept: CARDIOLOGY | Facility: CLINIC | Age: 78
End: 2023-07-25
Payer: MEDICARE

## 2023-07-25 VITALS
HEIGHT: 65 IN | BODY MASS INDEX: 23.99 KG/M2 | HEART RATE: 69 BPM | SYSTOLIC BLOOD PRESSURE: 126 MMHG | WEIGHT: 144 LBS | DIASTOLIC BLOOD PRESSURE: 78 MMHG | OXYGEN SATURATION: 99 %

## 2023-07-25 DIAGNOSIS — E78.2 MIXED HYPERLIPIDEMIA: ICD-10-CM

## 2023-07-25 DIAGNOSIS — R94.39 ABNORMAL STRESS TEST: Primary | ICD-10-CM

## 2023-07-25 DIAGNOSIS — I10 HYPERTENSION, UNSPECIFIED TYPE: ICD-10-CM

## 2023-07-25 PROCEDURE — 99213 OFFICE O/P EST LOW 20 MIN: CPT | Mod: PBBFAC,PN | Performed by: NURSE PRACTITIONER

## 2023-07-25 PROCEDURE — 99214 OFFICE O/P EST MOD 30 MIN: CPT | Mod: S$PBB,,, | Performed by: NURSE PRACTITIONER

## 2023-07-25 PROCEDURE — 99999 PR PBB SHADOW E&M-EST. PATIENT-LVL III: CPT | Mod: PBBFAC,,, | Performed by: NURSE PRACTITIONER

## 2023-07-25 PROCEDURE — 99999 PR PBB SHADOW E&M-EST. PATIENT-LVL III: ICD-10-PCS | Mod: PBBFAC,,, | Performed by: NURSE PRACTITIONER

## 2023-07-25 PROCEDURE — 99214 PR OFFICE/OUTPT VISIT, EST, LEVL IV, 30-39 MIN: ICD-10-PCS | Mod: S$PBB,,, | Performed by: NURSE PRACTITIONER

## 2023-07-25 RX ORDER — ROSUVASTATIN CALCIUM 20 MG/1
20 TABLET, COATED ORAL NIGHTLY
Qty: 90 TABLET | Refills: 3 | Status: SHIPPED | OUTPATIENT
Start: 2023-07-25 | End: 2024-07-24

## 2023-07-25 RX ORDER — ASPIRIN 81 MG/1
81 TABLET ORAL DAILY
COMMUNITY
End: 2023-11-06

## 2023-07-25 RX ORDER — ROSUVASTATIN CALCIUM 20 MG/1
20 TABLET, COATED ORAL NIGHTLY
Qty: 90 TABLET | Refills: 1 | Status: SHIPPED | OUTPATIENT
Start: 2023-07-25 | End: 2023-07-25

## 2023-07-25 NOTE — ASSESSMENT & PLAN NOTE
Pt has declined angiography; LDL is very high; discussed adding nexletol, declined  Did Cardiac CTA 7/18/23 but scan isn't read yet; Will call [facility] to try get results  Advised on warning signs of heart attack and when to go to nearest ER or call 911  Advised to Add aspirin 81 mg daily (w/ food)

## 2023-07-25 NOTE — ASSESSMENT & PLAN NOTE
BP well controlled at home and in office today  126/78  Home BP log scanned into chart  Weight is well controlled  Continue Ramipril 2.5 mg daily  Low sodium diet

## 2023-07-25 NOTE — ASSESSMENT & PLAN NOTE
Latest Reference Range & Units Most Recent   LDL Cholesterol External 63.0 - 159.0 mg/dL 175.6 (H)  3/17/23 08:40   (H): Data is abnormally high  Crestor 20 mg called in  Had recently restarted Crestor 10 mg daily  Low cholesterol diet print out  Discussed adding nexletol  FLP  In 3 months

## 2023-07-25 NOTE — PROGRESS NOTES
Subjective:    Patient ID:  Yesy Knight is a 78 y.o. 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 Phillips last OV.   Just had esophageal dilatation yesterday  Compliant with other meds    Pt had [facility] chest at [facility] on 7/18/23 and results not read yet;   Never smoker; no ETOH    No sleep apnea but not sleeping well/insomnia    No fluid retention, dizziness, syncope, palpitations  +occasional ocular migraine    04/17/2023  Chest pain resolved.  Patient is completely asymptomatic currently and is functionally very active at baseline and denies any exertional angina.   Nuclear stress test suggestive of LAD territory ischemia.  Discussed with the patient regarding coronary angiogram to rule out occlusive coronary artery disease.  However patient does not want to undergo invasive procedures at this point and would like to proceed with noninvasive testing.  Advised coronary CT angiogram.  Start aspirin 81 mg for now.   Hypertension-continue ramipril 2.5 mg. home BP log reviewed. blood pressure controlled at home.  Continue home BP monitoring  Dyslipidemia- patient recently restarted Crestor and tolerating well.  Continue Crestor 20 mg daily at bedtime.  Repeat lipid profile in 2-3 months     3/02/23   77-year-old female with past medical history of hypertension chronic back pain came here for establishment of care.  Patient complains of intermittent sharp chest pains which lasts for few minutes and resolve on their own with no aggravating or relieving factors, unrelated to exertion, no associated symptoms.  Symptoms happen once a week.  Denies any exertional chest pain at baseline.  Patient states that her  blood pressure is elevated because of her chronic back pain.  Sometimes she has normal blood pressure at home.  Compliant with medications.  She stopped Crestor last year because of myalgias.  Atypical chest pain episodes  -echo and exercise nuclear stress test for further evaluation  -continue ramipril. home BP monitoring and return the diary during next visit  -check lipid profile  -advised patient to go to the ER in case of recurrent/worsening symptoms        Most Recent Echocardiogram Results  Results for orders placed during the hospital encounter of 04/06/23    Echo Saline Bubble? No    Interpretation Summary  · The left ventricle is normal in size with mild concentric hypertrophy and normal systolic function.  · The estimated ejection fraction is 65%.  · Normal left ventricular diastolic function.  · Mild mitral regurgitation.  · The estimated PA systolic pressure is 24 mmHg.  · Normal right ventricular size with normal right ventricular systolic function.  · Normal central venous pressure (3 mmHg).  · Mild tricuspid regurgitation.      Most Recent Nuclear Stress Test Results  Results for orders placed during the hospital encounter of 04/06/23    Nuclear Stress - Cardiology Interpreted    Interpretation Summary    Abnormal myocardial perfusion scan.    There are two significant perfusion abnormalities.    Perfusion Abnormality #1 - There is a moderate intensity, moderate to large sized, reversible perfusion abnormality that is consistent with ischemia in the basal to apical anterior, anteroseptal and anterolateral wall(s) in the typical distribution of the LAD territory.    Perfusion Abnormality #2 - There is a moderate sized, equivocal perfusion abnormality that is fixed in the basal to apical lateral wall(s). This finding is equivocal due to soft tissue shadow.    There are no other significant perfusion abnormalities.    The gated perfusion images showed an ejection fraction of 96% at rest. The gated  perfusion images showed an ejection fraction of 89% post stress. Normal ejection fraction is greater than 53%.    There is normal wall motion at rest and post stress.    LV cavity size is normal at rest and normal at stress.    The ECG portion of the study is negative for ischemia.    The patient reported no chest pain during the stress test.    During stress, occasional PVCs are noted.    The patient exercised for 4 minutes 32 seconds on a [place] protocol, corresponding to a functional capacity of 7 METS, achieving a peak heart rate of 134 bpm, which is 98 % of the age predicted maximum heart rate.      Most Recent Cardiac PET Stress Test Results  No results found for this or any previous visit.      Most Recent Cardiovascular Angiogram results  No results found for this or any previous visit.      Other Most Recent Cardiology Results  Results for orders placed during the hospital encounter of 02/21/19    CARDIAC MONITORING STRIPS      REVIEW OF SYSTEMS: As noted in HPI   CARDIOVASCULAR: No recent chest pain, palpitations, arm/neck/jaw pain, or edema.  RESPIRATORY: No recent fever, cough, SOB.  : No blood in the urine  GI: No reflux, nausea, vomiting, or blood in stool.   MUSCULOSKELETAL: No falls.   NEURO: No headaches, syncope, or dizziness.  EYES: No sudden changes in vision.     Past Medical History:   Diagnosis Date    Hypertension     Wears partial dentures     bottom partials     Past Surgical History:   Procedure Laterality Date    COLPORRHAPHY N/A 02/21/2019    Procedure: COLPORRHAPHY;  Surgeon: Rafael Guevara MD;  Location: [facility];  Service: OB/GYN;  Laterality: N/A;    CYSTOSCOPY N/A 02/21/2019    Procedure: CYSTOSCOPY;  Surgeon: Rafael Guevara MD;  Location: [facility] OR;  Service: OB/GYN;  Laterality: N/A;    HYSTERECTOMY      MOUTH SURGERY  03/2018     second one 10/2018    SACROSPINOUS LIGAMENT FIXATION  02/21/2019    Procedure: FIXATION, LIGAMENT, SACROSPINOUS;  Surgeon: Rafael Guevara MD;   Location: [facility] OR;  Service: OB/GYN;;    STOMACH SURGERY      TOTAL VAGINAL HYSTERECTOMY N/A 02/21/2019    Procedure: HYSTERECTOMY, TOTAL, VAGINAL;  Surgeon: Rafael Guevara MD;  Location: [facility] OR;  Service: OB/GYN;  Laterality: N/A;     Social History     Tobacco Use    Smoking status: Never    Smokeless tobacco: Never   Substance Use Topics    Alcohol use: Yes     Alcohol/week: 2.0 standard drinks     Types: 2 Glasses of wine per week    Drug use: No         Objective      Vitals:    07/25/23 0855   BP: 126/78   Pulse: 69       LAST EKG  Results for orders placed or performed in visit on 03/02/23   IN OFFICE EKG 12-LEAD (to [place])    Collection Time: 03/02/23  2:49 PM    Narrative    Test Reason : R07.9,    Vent. Rate : 079 BPM     Atrial Rate : 079 BPM     P-R Int : 174 ms          QRS Dur : 086 ms      QT Int : 354 ms       P-R-T Axes : 000 -12 056 degrees     QTc Int : 405 ms    Normal sinus rhythm  Cannot rule out Anterior infarct (cited on or before 09-FEB-2021)  Abnormal ECG  When compared with ECG of 09-FEB-2021 09:52,  Nonspecific T wave abnormality no longer evident in Inferior leads  Confirmed by Efraín Montero MD ([id number]) on 3/20/2023 3:13:05 PM    Referred By:             Confirmed By:Efraín Montero MD     LIPIDS - LAST 2   Lab Results   Component Value Date    CHOL 249 (H) 03/17/2023    CHOL 168 06/30/2022    HDL 64 03/17/2023    HDL 72 06/30/2022    LDLCALC 175.6 (H) 03/17/2023    LDLCALC 87.8 06/30/2022    TRIG 47 03/17/2023    TRIG 41 06/30/2022    CHOLHDL 25.7 03/17/2023    CHOLHDL 42.9 06/30/2022     CARDIAC PROFILE - LAST 2  No results found for: BNP, CPK, CPKMB, LDH, TROPONINI   CBC - LAST 2  Lab Results   Component Value Date    WBC 6.94 06/25/2021    WBC 6.67 01/15/2021    RBC 4.28 06/25/2021    RBC 4.50 01/15/2021    HGB 13.8 06/25/2021    HGB 14.7 01/15/2021    HCT 40.9 06/25/2021    HCT 44.1 01/15/2021     06/25/2021     01/15/2021     No results found for: LABPT, INR,  APTT  CHEMISTRY - LAST 2  Lab Results   Component Value Date     06/30/2022     11/16/2021    K 3.9 06/30/2022    K 4.4 11/16/2021     06/30/2022     11/16/2021    CO2 28 06/30/2022    CO2 28 11/16/2021    ANIONGAP 7 (L) 06/30/2022    ANIONGAP 6 (L) 11/16/2021    BUN 29 (H) 06/30/2022    BUN 20 11/16/2021    CREATININE 0.8 06/30/2022    CREATININE 0.8 11/16/2021    GLU 94 06/30/2022     11/16/2021    CALCIUM 9.3 06/30/2022    CALCIUM 9.4 11/16/2021    MG 1.9 01/15/2021    ALBUMIN 4.1 01/15/2021    ALBUMIN 4.1 08/06/2020    PROT 7.1 01/15/2021    PROT 6.6 08/06/2020    ALKPHOS 61 01/15/2021    ALKPHOS 55 08/06/2020    ALT 29 01/15/2021    ALT 26 08/06/2020    AST 26 01/15/2021    AST 24 08/06/2020    BILITOT 1.2 (H) 01/15/2021    BILITOT 0.7 08/06/2020      ENDOCRINE - LAST 2  Lab Results   Component Value Date    TSH 4.330 09/23/2022    TSH 4.870 11/16/2021        PHYSICAL EXAM  CONSTITUTIONAL: Well built, well nourished pleasant female in no apparent distress  NECK: no carotid bruit, no JVD  LUNGS: CTA  CHEST WALL: no tenderness  HEART: regular rate and rhythm, S1, S2 normal, no murmur, click, rub or gallop   ABDOMEN: soft, non-tender; bowel sounds normal; no masses,  no organomegaly  EXTREMITIES: Extremities normal, no edema, no calf tenderness noted  NEURO: AAO X 3    I HAVE REVIEWED :    The vital signs, most recent cardiac testing, and most recent pertinent non-cardiology provider notes.    Current Outpatient Medications   Medication Instructions    aspirin (ECOTRIN) 81 mg, Oral, Daily    cholecalciferol, vitamin D3, (VITAMIN D3 ORAL) Oral    HYDROcodone-acetaminophen (NORCO) 7.5-325 mg per tablet No dose, route, or frequency recorded.    ibuprofen (ADVIL,MOTRIN) 600 MG tablet No dose, route, or frequency recorded.    levothyroxine (SYNTHROID) 25 MCG tablet TAKE (1) TABLET BY MOUTH ONCE DAILY.    ramipriL (ALTACE) 2.5 mg, Oral, Daily    rosuvastatin (CRESTOR) 20 mg, Oral, Nightly     triamcinolone acetonide 0.1% (KENALOG) 0.1 % cream Apply to rash on ear bid      Assessment & Plan     Mixed hyperlipidemia   Latest Reference Range & Units Most Recent   LDL Cholesterol External 63.0 - 159.0 mg/dL 175.6 (H)  3/17/23 08:40   (H): Data is abnormally high  Crestor 20 mg called in  Had recently restarted Crestor 10 mg daily  Low cholesterol diet print out  Discussed adding nexletol  FLP  In 3 months    Hypertension  BP well controlled at home and in office today  126/78  Home BP log scanned into chart  Weight is well controlled  Continue Ramipril 2.5 mg daily  Low sodium diet    BMI 23.0-23.9, adult  stable    Abnormal stress test  Pt has declined angiography; LDL is very high; discussed adding nexletol, declined  Did Cardiac CTA 7/18/23 but scan isn't read yet; Will call [facility] to try get results  Advised on warning signs of heart attack and when to go to nearest ER or call 911  Advised to Add aspirin 81 mg daily (w/ food)

## 2023-09-05 ENCOUNTER — LAB VISIT (OUTPATIENT)
Dept: LAB | Facility: HOSPITAL | Age: 78
End: 2023-09-05
Attending: NURSE PRACTITIONER
Payer: MEDICARE

## 2023-09-05 DIAGNOSIS — E78.2 MIXED HYPERLIPIDEMIA: ICD-10-CM

## 2023-09-05 LAB
CHOLEST SERPL-MCNC: 167 MG/DL (ref 120–199)
CHOLEST/HDLC SERPL: 3.1 {RATIO} (ref 2–5)
HDLC SERPL-MCNC: 54 MG/DL (ref 40–75)
HDLC SERPL: 32.3 % (ref 20–50)
LDLC SERPL CALC-MCNC: 99.2 MG/DL (ref 63–159)
NONHDLC SERPL-MCNC: 113 MG/DL
TRIGL SERPL-MCNC: 69 MG/DL (ref 30–150)

## 2023-09-05 PROCEDURE — 36415 COLL VENOUS BLD VENIPUNCTURE: CPT | Performed by: NURSE PRACTITIONER

## 2023-09-05 PROCEDURE — 80061 LIPID PANEL: CPT | Performed by: NURSE PRACTITIONER

## 2023-09-22 ENCOUNTER — LAB VISIT (OUTPATIENT)
Dept: LAB | Facility: HOSPITAL | Age: 78
End: 2023-09-22
Attending: INTERNAL MEDICINE
Payer: MEDICARE

## 2023-09-22 DIAGNOSIS — E03.9 MYXEDEMA HEART DISEASE: Primary | ICD-10-CM

## 2023-09-22 DIAGNOSIS — I51.9 MYXEDEMA HEART DISEASE: Primary | ICD-10-CM

## 2023-09-22 LAB
T4 FREE SERPL-MCNC: 0.83 NG/DL (ref 0.71–1.51)
TSH SERPL DL<=0.005 MIU/L-ACNC: 4.3 UIU/ML (ref 0.34–5.6)

## 2023-09-22 PROCEDURE — 84439 ASSAY OF FREE THYROXINE: CPT | Performed by: INTERNAL MEDICINE

## 2023-09-22 PROCEDURE — 84443 ASSAY THYROID STIM HORMONE: CPT | Performed by: INTERNAL MEDICINE

## 2023-09-22 PROCEDURE — 36415 COLL VENOUS BLD VENIPUNCTURE: CPT | Performed by: INTERNAL MEDICINE

## 2023-09-25 DIAGNOSIS — R31.1 BENIGN MICROSCOPIC HEMATURIA: Primary | ICD-10-CM

## 2023-09-26 DIAGNOSIS — M81.0 SENILE OSTEOPOROSIS: Primary | ICD-10-CM

## 2023-09-30 ENCOUNTER — LAB VISIT (OUTPATIENT)
Dept: LAB | Facility: HOSPITAL | Age: 78
End: 2023-09-30
Attending: INTERNAL MEDICINE
Payer: MEDICARE

## 2023-09-30 DIAGNOSIS — Z79.899 ENCOUNTER FOR LONG-TERM (CURRENT) USE OF OTHER MEDICATIONS: ICD-10-CM

## 2023-09-30 DIAGNOSIS — M81.0 SENILE OSTEOPOROSIS: Primary | ICD-10-CM

## 2023-09-30 LAB
ANION GAP SERPL CALC-SCNC: 6 MMOL/L (ref 8–16)
BUN SERPL-MCNC: 15 MG/DL (ref 8–23)
CALCIUM SERPL-MCNC: 9.7 MG/DL (ref 8.7–10.5)
CHLORIDE SERPL-SCNC: 108 MMOL/L (ref 95–110)
CO2 SERPL-SCNC: 26 MMOL/L (ref 23–29)
CREAT SERPL-MCNC: 0.7 MG/DL (ref 0.5–1.4)
EST. GFR  (NO RACE VARIABLE): >60 ML/MIN/1.73 M^2
GLUCOSE SERPL-MCNC: 102 MG/DL (ref 70–110)
POTASSIUM SERPL-SCNC: 4.6 MMOL/L (ref 3.5–5.1)
SODIUM SERPL-SCNC: 140 MMOL/L (ref 136–145)

## 2023-09-30 PROCEDURE — 36415 COLL VENOUS BLD VENIPUNCTURE: CPT | Mod: PO | Performed by: INTERNAL MEDICINE

## 2023-09-30 PROCEDURE — 80048 BASIC METABOLIC PNL TOTAL CA: CPT | Performed by: INTERNAL MEDICINE

## 2023-10-05 ENCOUNTER — HOSPITAL ENCOUNTER (OUTPATIENT)
Dept: RADIOLOGY | Facility: HOSPITAL | Age: 78
Discharge: HOME | End: 2023-10-05
Attending: SPECIALIST
Payer: MEDICARE

## 2023-10-05 ENCOUNTER — HOSPITAL ENCOUNTER (OUTPATIENT)
Dept: RADIOLOGY | Facility: HOSPITAL | Age: 78
Discharge: HOME | End: 2023-10-05
Attending: INTERNAL MEDICINE
Payer: MEDICARE

## 2023-10-05 DIAGNOSIS — M81.0 SENILE OSTEOPOROSIS: ICD-10-CM

## 2023-10-05 DIAGNOSIS — R31.1 BENIGN MICROSCOPIC HEMATURIA: ICD-10-CM

## 2023-10-05 PROCEDURE — 76770 US EXAM ABDO BACK WALL COMP: CPT | Mod: TC,PO

## 2023-10-05 PROCEDURE — 77080 DXA BONE DENSITY AXIAL: CPT | Mod: TC,PO

## 2023-10-11 DIAGNOSIS — N20.0 URIC ACID NEPHROLITHIASIS: Primary | ICD-10-CM

## 2023-10-13 ENCOUNTER — HOSPITAL ENCOUNTER (OUTPATIENT)
Dept: RADIOLOGY | Facility: HOSPITAL | Age: 78
Discharge: HOME | End: 2023-10-13
Attending: SPECIALIST
Payer: MEDICARE

## 2023-10-13 DIAGNOSIS — N20.0 URIC ACID NEPHROLITHIASIS: ICD-10-CM

## 2023-10-13 PROCEDURE — 74018 RADEX ABDOMEN 1 VIEW: CPT | Mod: TC,PO

## 2023-10-13 PROCEDURE — 74176 CT ABD & PELVIS W/O CONTRAST: CPT | Mod: TC,PO

## 2023-10-18 ENCOUNTER — TELEPHONE (OUTPATIENT)
Dept: CARDIOLOGY | Facility: CLINIC | Age: 78
End: 2023-10-18
Payer: MEDICARE

## 2023-10-18 NOTE — TELEPHONE ENCOUNTER
----- Message from Jen Varela sent at 10/18/2023 11:25 AM CDT -----  Type: Need Medical Advice   Who Called: Patient  Best callback number: [phone]  Additional Information: Patient returned call to reschedule her appointment with Asif Lees, she would prefer Omid or his NP  Please call to further assist, Thanks.

## 2023-11-06 ENCOUNTER — OFFICE VISIT (OUTPATIENT)
Dept: CARDIOLOGY | Facility: CLINIC | Age: 78
End: 2023-11-06
Payer: MEDICARE

## 2023-11-06 VITALS
BODY MASS INDEX: 24.02 KG/M2 | OXYGEN SATURATION: 98 % | SYSTOLIC BLOOD PRESSURE: 120 MMHG | HEIGHT: 65 IN | DIASTOLIC BLOOD PRESSURE: 70 MMHG | HEART RATE: 71 BPM | WEIGHT: 144.19 LBS

## 2023-11-06 DIAGNOSIS — I10 HYPERTENSION, UNSPECIFIED TYPE: ICD-10-CM

## 2023-11-06 DIAGNOSIS — R94.39 ABNORMAL STRESS TEST: ICD-10-CM

## 2023-11-06 DIAGNOSIS — E78.2 MIXED HYPERLIPIDEMIA: Primary | ICD-10-CM

## 2023-11-06 DIAGNOSIS — I10 PRIMARY HYPERTENSION: ICD-10-CM

## 2023-11-06 PROCEDURE — 99999 PR PBB SHADOW E&M-EST. PATIENT-LVL III: CPT | Mod: PBBFAC,,, | Performed by: INTERNAL MEDICINE

## 2023-11-06 PROCEDURE — 99213 PR OFFICE/OUTPT VISIT, EST, LEVL III, 20-29 MIN: ICD-10-PCS | Mod: S$PBB,,, | Performed by: INTERNAL MEDICINE

## 2023-11-06 PROCEDURE — 99213 OFFICE O/P EST LOW 20 MIN: CPT | Mod: PBBFAC,PN | Performed by: INTERNAL MEDICINE

## 2023-11-06 PROCEDURE — 99213 OFFICE O/P EST LOW 20 MIN: CPT | Mod: S$PBB,,, | Performed by: INTERNAL MEDICINE

## 2023-11-06 PROCEDURE — 99999 PR PBB SHADOW E&M-EST. PATIENT-LVL III: ICD-10-PCS | Mod: PBBFAC,,, | Performed by: INTERNAL MEDICINE

## 2023-11-06 RX ORDER — ESOMEPRAZOLE MAGNESIUM 40 MG/1
40 CAPSULE, DELAYED RELEASE ORAL DAILY
COMMUNITY
Start: 2023-08-18

## 2023-11-06 NOTE — PROGRESS NOTES
Patient ID:  Yesy Knight is a 78 y.o. female who presents for follow-up of Hypertension, Hyperlipidemia, and Results (CTA)      Ms. Knight is here for the results of the CTA that showed no significant coronary artery disease.  Her coronary calcium score was low.  She had a bulging disc in her back in March and she had some chest pain.  A stress test was done at that time that shows some abnormalities.  A coronary arteriogram was offer although she opted with a CTA of the coronaries.  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        Past Medical History:   Diagnosis Date    Hyperlipidemia     Hypertension     Wears partial dentures     bottom partials        Past Surgical History:   Procedure Laterality Date    COLPORRHAPHY N/A 02/21/2019    Procedure: COLPORRHAPHY;  Surgeon: Rafael Guevara MD;  Location: [facility] OR;  Service: OB/GYN;  Laterality: N/A;    CYSTOSCOPY N/A 02/21/2019    Procedure: CYSTOSCOPY;  Surgeon: Rafael Guevara MD;  Location: [facility] OR;  Service: OB/GYN;  Laterality: N/A;    HYSTERECTOMY      MOUTH SURGERY  03/2018     second one 10/2018    SACROSPINOUS LIGAMENT FIXATION  02/21/2019    Procedure: FIXATION, LIGAMENT, SACROSPINOUS;  Surgeon: Rafael Guevara MD;  Location: [facility] OR;  Service: OB/GYN;;    STOMACH SURGERY      TOTAL VAGINAL HYSTERECTOMY N/A 02/21/2019    Procedure: HYSTERECTOMY, TOTAL, VAGINAL;  Surgeon: Rafael Guevara MD;  Location: [facility] OR;  Service: OB/GYN;  Laterality: N/A;          Current Outpatient Medications   Medication Instructions    cholecalciferol, vitamin D3, (VITAMIN D3 ORAL) Oral    esomeprazole (NEXIUM) 40 mg, Oral, Daily    HYDROcodone-acetaminophen (NORCO) 7.5-325 mg per tablet No dose, route, or frequency recorded.    ibuprofen (ADVIL,MOTRIN) 600 MG tablet No dose, route, or frequency recorded.    levothyroxine (SYNTHROID) 25 MCG tablet TAKE (1) TABLET BY MOUTH ONCE DAILY.    ramipriL (ALTACE) 2.5 mg, Oral, Daily  "   rosuvastatin (CRESTOR) 20 mg, Oral, Nightly    triamcinolone acetonide 0.1% (KENALOG) 0.1 % cream Apply to rash on ear bid        Review of patient's allergies indicates:  No Known Allergies     Review of Systems   Cardiovascular:  Negative for chest pain, dyspnea on exertion and palpitations.   Respiratory:  Negative for cough and shortness of breath.    Gastrointestinal:  Positive for diarrhea and heartburn.        Objective:     Vitals:    11/06/23 1429   BP: 120/70   BP Location: Left arm   Patient Position: Sitting   BP Method: Medium (Manual)   Pulse: 71   SpO2: 98%   Weight: 65.4 kg (144 lb 2.9 oz)   Height: 5' 5" (1.651 m)       Physical Exam  Vitals and nursing note reviewed.   Constitutional:       Appearance: She is well-developed.   HENT:      Head: Normocephalic and atraumatic.   Eyes:      Conjunctiva/sclera: Conjunctivae normal.   Cardiovascular:      Rate and Rhythm: Normal rate and regular rhythm.      Heart sounds: Normal heart sounds.   Pulmonary:      Effort: Pulmonary effort is normal.      Breath sounds: Normal breath sounds.   Abdominal:      General: Bowel sounds are normal.      Palpations: Abdomen is soft.   Musculoskeletal:         General: Normal range of motion.   Skin:     General: Skin is warm and dry.   Neurological:      Mental Status: She is alert and oriented to person, place, and time.   Psychiatric:         Behavior: Behavior normal.         Thought Content: Thought content normal.         Judgment: Judgment normal.       CMP  Sodium   Date Value Ref Range Status   09/30/2023 140 136 - 145 mmol/L Final     Potassium   Date Value Ref Range Status   09/30/2023 4.6 3.5 - 5.1 mmol/L Final     Chloride   Date Value Ref Range Status   09/30/2023 108 95 - 110 mmol/L Final     CO2   Date Value Ref Range Status   09/30/2023 26 23 - 29 mmol/L Final     Glucose   Date Value Ref Range Status   09/30/2023 102 70 - 110 mg/dL Final     BUN   Date Value Ref Range Status   09/30/2023 15 8 - 23 " mg/dL Final     Creatinine   Date Value Ref Range Status   09/30/2023 0.7 0.5 - 1.4 mg/dL Final     Calcium   Date Value Ref Range Status   09/30/2023 9.7 8.7 - 10.5 mg/dL Final     Total Protein   Date Value Ref Range Status   01/15/2021 7.1 6.0 - 8.4 g/dL Final     Albumin   Date Value Ref Range Status   01/15/2021 4.1 3.5 - 5.2 g/dL Final     Total Bilirubin   Date Value Ref Range Status   01/15/2021 1.2 (H) 0.1 - 1.0 mg/dL Final     Comment:     For infants and newborns, interpretation of results should be based  on gestational age, weight and in agreement with clinical  observations.  Premature Infant recommended reference ranges:  Up to 24 hours.............<8.0 mg/dL  Up to 48 hours............<12.0 mg/dL  3-5 days..................<15.0 mg/dL  6-29 days.................<15.0 mg/dL       Alkaline Phosphatase   Date Value Ref Range Status   01/15/2021 61 55 - 135 U/L Final     AST   Date Value Ref Range Status   01/15/2021 26 10 - 40 U/L Final     ALT   Date Value Ref Range Status   01/15/2021 29 10 - 44 U/L Final     Anion Gap   Date Value Ref Range Status   09/30/2023 6 (L) 8 - 16 mmol/L Final     eGFR if    Date Value Ref Range Status   06/30/2022 >60.0 >60 mL/min/1.73 m^2 Final     eGFR if non    Date Value Ref Range Status   06/30/2022 >60.0 >60 mL/min/1.73 m^2 Final     Comment:     Calculation used to obtain the estimated glomerular filtration  rate (eGFR) is the CKD-EPI equation.         BMP  Lab Results   Component Value Date     09/30/2023    K 4.6 09/30/2023     09/30/2023    CO2 26 09/30/2023    BUN 15 09/30/2023    CREATININE 0.7 09/30/2023    CALCIUM 9.7 09/30/2023    ANIONGAP 6 (L) 09/30/2023    ESTGFRAFRICA >60.0 06/30/2022    EGFRNONAA >60.0 06/30/2022      BNP  @LABRCNTIP(BNP,BNPTRIAGEBLO)@   Lab Results   Component Value Date    CHOL 167 09/05/2023    CHOL 249 (H) 03/17/2023    CHOL 168 06/30/2022     Lab Results   Component Value Date    HDL 54  "09/05/2023    HDL 64 03/17/2023    HDL 72 06/30/2022     Lab Results   Component Value Date    LDLCALC 99.2 09/05/2023    LDLCALC 175.6 (H) 03/17/2023    LDLCALC 87.8 06/30/2022     Lab Results   Component Value Date    TRIG 69 09/05/2023    TRIG 47 03/17/2023    TRIG 41 06/30/2022     Lab Results   Component Value Date    CHOLHDL 32.3 09/05/2023    CHOLHDL 25.7 03/17/2023    CHOLHDL 42.9 06/30/2022      Lab Results   Component Value Date    TSH 4.300 09/22/2023    FREET4 0.83 09/22/2023     No results found for: "LABA1C", "HGBA1C"  Lab Results   Component Value Date    WBC 6.94 06/25/2021    HGB 13.8 06/25/2021    HCT 40.9 06/25/2021    MCV 96 06/25/2021     06/25/2021         Results for orders placed during the hospital encounter of 04/06/23    Echo Saline Bubble? No    Interpretation Summary  · The left ventricle is normal in size with mild concentric hypertrophy and normal systolic function.  · The estimated ejection fraction is 65%.  · Normal left ventricular diastolic function.  · Mild mitral regurgitation.  · The estimated PA systolic pressure is 24 mmHg.  · Normal right ventricular size with normal right ventricular systolic function.  · Normal central venous pressure (3 mmHg).  · Mild tricuspid regurgitation.     No results found for this or any previous visit.      Agatston Score: The total coronary calcium score is 48, distributed as left main 0, LAD 47, circumflex 1 and RCA 0.  Approximately 41% of individuals with same age, race and gender would have less coronary calcium than this patient. The calculated arterial age is 67 years.     Cardiac morphology: The left atrium is dilated.  The LV size is normal.  There is lipomatous hypertrophy of the atrial septum.     Coronary CT angiogram: The overall quality of the CT angiographic examination is adequate.  The coronary artery system is right dominant with normal origins. The left main coronary artery has no significant stenosis. The LAD is a " tortuous vessel and has mild calcified plaques proximally with less than 25% stenosis.  It has a partial intramyocardial course in its mid/distal portion.  D1 is a large caliber vessel and has calcified plaques proximally with moderate degree stenosis (suboptimal assessment due to artifact/motion). The LCX is the nondominant vessel and has mild calcified plaque at its ostium with less than 25% stenosis.  OM 1 is a large caliber vessel which is tortuous and has a distal intramyocardial course.  The RCA is the dominant vessel and has minimal calcified plaques in distal PDA.     Impression:  Impression:     1.  CAD as above.  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     2.  Low absolute coronary calcium score with intermediate coronary calcium score percentile and decreased arterial age.     3.  Noncardiac portion of this test will be reported by radiology.        Electronically signed by: Ulices Carrasco MD  Date:                                            08/08/2023      Assessment:       Abnormal stress test  The patient denies any symptoms of angina.  CTA was performed that showed no significant CAD her coronary calcium score of 48    Mixed hyperlipidemia  On 20 mg of rosuvastatin.    Hypertension  Controlled on ramipril 2.5 mg daily       Plan:       Continue the current medical therapy return to the office in 6 months with a lipid profile.

## 2023-11-13 NOTE — ASSESSMENT & PLAN NOTE
The patient denies any symptoms of angina.  CTA was performed that showed no significant CAD her coronary calcium score of 48

## 2023-11-27 RX ORDER — RAMIPRIL 2.5 MG/1
2.5 CAPSULE ORAL DAILY
Qty: 90 CAPSULE | Refills: 3 | Status: SHIPPED | OUTPATIENT
Start: 2023-11-27

## 2023-11-27 NOTE — TELEPHONE ENCOUNTER
----- Message from Valarie Ghosh sent at 11/27/2023  8:17 AM CST -----  Contact: self  Type:  RX Refill Request    Who Called:  patient  Refill or New Rx:  refill  RX Name and Strength:  ramipriL (ALTACE) 2.5 MG capsule  How is the patient currently taking it? (ex. 1XDay):  as directed   Is this a 30 day or 90 day RX:  90  Preferred Pharmacy with phone number:    [facility] [place] [facility] [address]  [address] [address]  Phone: [phone] Fax: [phone]  Local or Mail Order:  .local  Ordering Provider:  Dr Gwendolyn Hirsch Call Back Number:  [phone]  Additional Information:  Pt is out of her medicine she did not realize no refills left. Needs it sent in today please and  Thanks

## 2023-12-04 ENCOUNTER — LAB VISIT (OUTPATIENT)
Dept: LAB | Facility: HOSPITAL | Age: 78
End: 2023-12-04
Attending: INTERNAL MEDICINE
Payer: MEDICARE

## 2023-12-04 DIAGNOSIS — Z79.899 ENCOUNTER FOR LONG-TERM (CURRENT) USE OF OTHER MEDICATIONS: ICD-10-CM

## 2023-12-04 DIAGNOSIS — Z79.899 ENCOUNTER FOR LONG-TERM (CURRENT) USE OF OTHER MEDICATIONS: Primary | ICD-10-CM

## 2023-12-04 LAB
25(OH)D3+25(OH)D2 SERPL-MCNC: 40 NG/ML (ref 30–96)
ANION GAP SERPL CALC-SCNC: 5 MMOL/L (ref 8–16)
BUN SERPL-MCNC: 17 MG/DL (ref 8–23)
CALCIUM SERPL-MCNC: 9.7 MG/DL (ref 8.7–10.5)
CHLORIDE SERPL-SCNC: 107 MMOL/L (ref 95–110)
CO2 SERPL-SCNC: 28 MMOL/L (ref 23–29)
CREAT SERPL-MCNC: 0.7 MG/DL (ref 0.5–1.4)
EST. GFR  (NO RACE VARIABLE): >60 ML/MIN/1.73 M^2
GLUCOSE SERPL-MCNC: 110 MG/DL (ref 70–110)
POTASSIUM SERPL-SCNC: 4.2 MMOL/L (ref 3.5–5.1)
SODIUM SERPL-SCNC: 140 MMOL/L (ref 136–145)

## 2023-12-04 PROCEDURE — 82306 VITAMIN D 25 HYDROXY: CPT | Performed by: INTERNAL MEDICINE

## 2023-12-04 PROCEDURE — 36415 COLL VENOUS BLD VENIPUNCTURE: CPT | Performed by: INTERNAL MEDICINE

## 2023-12-04 PROCEDURE — 80048 BASIC METABOLIC PNL TOTAL CA: CPT | Performed by: INTERNAL MEDICINE

## 2024-04-15 ENCOUNTER — LAB VISIT (OUTPATIENT)
Dept: LAB | Facility: HOSPITAL | Age: 79
End: 2024-04-15
Attending: INTERNAL MEDICINE
Payer: MEDICARE

## 2024-04-15 DIAGNOSIS — I10 HYPERTENSION, UNSPECIFIED TYPE: ICD-10-CM

## 2024-04-15 DIAGNOSIS — M81.0 SENILE OSTEOPOROSIS: ICD-10-CM

## 2024-04-15 DIAGNOSIS — M81.0 SENILE OSTEOPOROSIS: Primary | ICD-10-CM

## 2024-04-15 DIAGNOSIS — E78.2 MIXED HYPERLIPIDEMIA: ICD-10-CM

## 2024-04-15 DIAGNOSIS — Z79.899 ENCOUNTER FOR LONG-TERM (CURRENT) USE OF OTHER MEDICATIONS: ICD-10-CM

## 2024-04-15 DIAGNOSIS — I10 PRIMARY HYPERTENSION: ICD-10-CM

## 2024-04-15 LAB
25(OH)D3+25(OH)D2 SERPL-MCNC: 42 NG/ML (ref 30–96)
ANION GAP SERPL CALC-SCNC: 6 MMOL/L (ref 8–16)
BUN SERPL-MCNC: 24 MG/DL (ref 8–23)
CALCIUM SERPL-MCNC: 9.6 MG/DL (ref 8.7–10.5)
CHLORIDE SERPL-SCNC: 105 MMOL/L (ref 95–110)
CHOLEST SERPL-MCNC: 136 MG/DL (ref 120–199)
CHOLEST/HDLC SERPL: 2.3 {RATIO} (ref 2–5)
CO2 SERPL-SCNC: 27 MMOL/L (ref 23–29)
CREAT SERPL-MCNC: 0.8 MG/DL (ref 0.5–1.4)
EST. GFR  (NO RACE VARIABLE): >60 ML/MIN/1.73 M^2
GLUCOSE SERPL-MCNC: 100 MG/DL (ref 70–110)
HDLC SERPL-MCNC: 59 MG/DL (ref 40–75)
HDLC SERPL: 43.4 % (ref 20–50)
LDLC SERPL CALC-MCNC: 63.4 MG/DL (ref 63–159)
NONHDLC SERPL-MCNC: 77 MG/DL
POTASSIUM SERPL-SCNC: 4.3 MMOL/L (ref 3.5–5.1)
SODIUM SERPL-SCNC: 138 MMOL/L (ref 136–145)
TRIGL SERPL-MCNC: 68 MG/DL (ref 30–150)

## 2024-04-15 PROCEDURE — 82306 VITAMIN D 25 HYDROXY: CPT | Performed by: INTERNAL MEDICINE

## 2024-04-15 PROCEDURE — 80048 BASIC METABOLIC PNL TOTAL CA: CPT | Performed by: INTERNAL MEDICINE

## 2024-04-15 PROCEDURE — 80061 LIPID PANEL: CPT | Performed by: INTERNAL MEDICINE

## 2024-06-07 DIAGNOSIS — Z12.31 ENCOUNTER FOR SCREENING MAMMOGRAM FOR MALIGNANT NEOPLASM OF BREAST: Primary | ICD-10-CM

## 2024-06-11 ENCOUNTER — HOSPITAL ENCOUNTER (OUTPATIENT)
Dept: RADIOLOGY | Facility: HOSPITAL | Age: 79
Discharge: HOME | End: 2024-06-11
Attending: SPECIALIST
Payer: MEDICARE

## 2024-06-11 DIAGNOSIS — Z12.31 ENCOUNTER FOR SCREENING MAMMOGRAM FOR MALIGNANT NEOPLASM OF BREAST: ICD-10-CM

## 2024-06-11 PROCEDURE — 77063 BREAST TOMOSYNTHESIS BI: CPT | Mod: 26,,, | Performed by: RADIOLOGY

## 2024-06-11 PROCEDURE — 77067 SCR MAMMO BI INCL CAD: CPT | Mod: TC,PO

## 2024-06-11 PROCEDURE — 77067 SCR MAMMO BI INCL CAD: CPT | Mod: 26,,, | Performed by: RADIOLOGY

## 2024-06-11 PROCEDURE — 77063 BREAST TOMOSYNTHESIS BI: CPT | Mod: TC,PO

## 2024-08-02 ENCOUNTER — OFFICE VISIT (OUTPATIENT)
Dept: CARDIOLOGY | Facility: CLINIC | Age: 79
End: 2024-08-02
Payer: MEDICARE

## 2024-08-02 VITALS
HEIGHT: 65 IN | OXYGEN SATURATION: 98 % | SYSTOLIC BLOOD PRESSURE: 122 MMHG | HEART RATE: 85 BPM | WEIGHT: 135.5 LBS | DIASTOLIC BLOOD PRESSURE: 80 MMHG | BODY MASS INDEX: 22.57 KG/M2

## 2024-08-02 DIAGNOSIS — I10 HYPERTENSION, UNSPECIFIED TYPE: ICD-10-CM

## 2024-08-02 DIAGNOSIS — I10 PRIMARY HYPERTENSION: ICD-10-CM

## 2024-08-02 DIAGNOSIS — I70.0 AORTIC ATHEROSCLEROSIS: ICD-10-CM

## 2024-08-02 DIAGNOSIS — E78.2 MIXED HYPERLIPIDEMIA: Primary | ICD-10-CM

## 2024-08-02 PROCEDURE — 99213 OFFICE O/P EST LOW 20 MIN: CPT | Mod: PBBFAC,PN | Performed by: INTERNAL MEDICINE

## 2024-08-02 PROCEDURE — 99213 OFFICE O/P EST LOW 20 MIN: CPT | Mod: S$PBB,,, | Performed by: INTERNAL MEDICINE

## 2024-08-02 PROCEDURE — 99999 PR PBB SHADOW E&M-EST. PATIENT-LVL III: CPT | Mod: PBBFAC,,, | Performed by: INTERNAL MEDICINE

## 2024-08-02 NOTE — PROGRESS NOTES
Patient ID:  Yesy Knight is a 79 y.o. female who presents for follow-up of Hypertension, Hyperlipidemia, and Chest Pain (With exertion)      Abnormal stress test  The patient denies any symptoms of angina.  CTA was performed that showed no significant CAD her coronary calcium score of 48     Mixed hyperlipidemia  On 20 mg of rosuvastatin.     Hypertension  Controlled on ramipril 2.5 mg daily     She denies any chest pains any shortness of breath.  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  The only problem she is having is that her thyroid medication is giving her diarrhea she has discontinue the medicine.  She has an appointment with Dr. Rao in the next few months.        Past Medical History:   Diagnosis Date    Hyperlipidemia     Hypertension     Wears partial dentures     bottom partials        Past Surgical History:   Procedure Laterality Date    COLPORRHAPHY N/A 02/21/2019    Procedure: COLPORRHAPHY;  Surgeon: Rafael Guevara MD;  Location: [facility] OR;  Service: OB/GYN;  Laterality: N/A;    CYSTOSCOPY N/A 02/21/2019    Procedure: CYSTOSCOPY;  Surgeon: Rafael Guevara MD;  Location: [facility] OR;  Service: OB/GYN;  Laterality: N/A;    HYSTERECTOMY      MOUTH SURGERY  03/2018     second one 10/2018    SACROSPINOUS LIGAMENT FIXATION  02/21/2019    Procedure: FIXATION, LIGAMENT, SACROSPINOUS;  Surgeon: Rafael Guevara MD;  Location: [facility] OR;  Service: OB/GYN;;    STOMACH SURGERY      TOTAL VAGINAL HYSTERECTOMY N/A 02/21/2019    Procedure: HYSTERECTOMY, TOTAL, VAGINAL;  Surgeon: Rafael Guevara MD;  Location: [facility] OR;  Service: OB/GYN;  Laterality: N/A;          Current Outpatient Medications   Medication Instructions    cholecalciferol, vitamin D3, (VITAMIN D3 ORAL) Oral    esomeprazole (NEXIUM) 40 mg, Oral, Daily    HYDROcodone-acetaminophen (NORCO) 7.5-325 mg per tablet No dose, route, or frequency recorded.    ibuprofen (ADVIL,MOTRIN) 600 MG tablet No  "dose, route, or frequency recorded.    levothyroxine (SYNTHROID) 25 MCG tablet TAKE (1) TABLET BY MOUTH ONCE DAILY.    ramipriL (ALTACE) 2.5 mg, Oral, Daily    risedronate (ACTONEL) 150 MG Tab     rosuvastatin (CRESTOR) 20 mg, Oral, Nightly    triamcinolone acetonide 0.1% (KENALOG) 0.1 % cream Apply to rash on ear bid        Review of patient's allergies indicates:  No Known Allergies     Review of Systems   Cardiovascular:  Negative for chest pain, dyspnea on exertion, irregular heartbeat and palpitations.   Respiratory:  Negative for cough and shortness of breath.         Objective:     Vitals:    08/02/24 1439   BP: 122/80   BP Location: Left arm   Patient Position: Sitting   BP Method: Medium (Manual)   Pulse: 85   SpO2: 98%   Weight: 61.4 kg (135 lb 7.6 oz)   Height: 5' 5" (1.651 m)       Physical Exam  Vitals and nursing note reviewed.   Constitutional:       Appearance: She is well-developed.   HENT:      Head: Normocephalic and atraumatic.   Eyes:      Conjunctiva/sclera: Conjunctivae normal.   Cardiovascular:      Rate and Rhythm: Normal rate and regular rhythm.      Heart sounds: Murmur (Grade 2/6 systolic murmur at the base) heard.   Pulmonary:      Effort: Pulmonary effort is normal.      Breath sounds: Normal breath sounds.   Abdominal:      General: Bowel sounds are normal.      Palpations: Abdomen is soft.   Musculoskeletal:         General: Normal range of motion.   Skin:     General: Skin is warm and dry.   Neurological:      Mental Status: She is alert and oriented to person, place, and time.   Psychiatric:         Behavior: Behavior normal.         Thought Content: Thought content normal.         Judgment: Judgment normal.       CMP  Sodium   Date Value Ref Range Status   04/15/2024 138 136 - 145 mmol/L Final     Potassium   Date Value Ref Range Status   04/15/2024 4.3 3.5 - 5.1 mmol/L Final     Chloride   Date Value Ref Range Status   04/15/2024 105 95 - 110 mmol/L Final     CO2   Date Value Ref " Range Status   04/15/2024 27 23 - 29 mmol/L Final     Glucose   Date Value Ref Range Status   04/15/2024 100 70 - 110 mg/dL Final     BUN   Date Value Ref Range Status   04/15/2024 24 (H) 8 - 23 mg/dL Final     Creatinine   Date Value Ref Range Status   04/15/2024 0.8 0.5 - 1.4 mg/dL Final     Calcium   Date Value Ref Range Status   04/15/2024 9.6 8.7 - 10.5 mg/dL Final     Total Protein   Date Value Ref Range Status   01/15/2021 7.1 6.0 - 8.4 g/dL Final     Albumin   Date Value Ref Range Status   01/15/2021 4.1 3.5 - 5.2 g/dL Final     Total Bilirubin   Date Value Ref Range Status   01/15/2021 1.2 (H) 0.1 - 1.0 mg/dL Final     Comment:     For infants and newborns, interpretation of results should be based  on gestational age, weight and in agreement with clinical  observations.  Premature Infant recommended reference ranges:  Up to 24 hours.............<8.0 mg/dL  Up to 48 hours............<12.0 mg/dL  3-5 days..................<15.0 mg/dL  6-29 days.................<15.0 mg/dL       Alkaline Phosphatase   Date Value Ref Range Status   01/15/2021 61 55 - 135 U/L Final     AST   Date Value Ref Range Status   01/15/2021 26 10 - 40 U/L Final     ALT   Date Value Ref Range Status   01/15/2021 29 10 - 44 U/L Final     Anion Gap   Date Value Ref Range Status   04/15/2024 6 (L) 8 - 16 mmol/L Final     eGFR if    Date Value Ref Range Status   06/30/2022 >60.0 >60 mL/min/1.73 m^2 Final     eGFR if non    Date Value Ref Range Status   06/30/2022 >60.0 >60 mL/min/1.73 m^2 Final     Comment:     Calculation used to obtain the estimated glomerular filtration  rate (eGFR) is the CKD-EPI equation.         BMP  Lab Results   Component Value Date     04/15/2024    K 4.3 04/15/2024     04/15/2024    CO2 27 04/15/2024    BUN 24 (H) 04/15/2024    CREATININE 0.8 04/15/2024    CALCIUM 9.6 04/15/2024    ANIONGAP 6 (L) 04/15/2024    ESTGFRAFRICA >60.0 06/30/2022    EGFRNONAA >60.0 06/30/2022     "  BNP  @LABRCNTIP(BNP,BNPTRIAGEBLO)@   Lab Results   Component Value Date    CHOL 136 04/15/2024    CHOL 167 09/05/2023    CHOL 249 (H) 03/17/2023     Lab Results   Component Value Date    HDL 59 04/15/2024    HDL 54 09/05/2023    HDL 64 03/17/2023     Lab Results   Component Value Date    LDLCALC 63.4 04/15/2024    LDLCALC 99.2 09/05/2023    LDLCALC 175.6 (H) 03/17/2023     Lab Results   Component Value Date    TRIG 68 04/15/2024    TRIG 69 09/05/2023    TRIG 47 03/17/2023     Lab Results   Component Value Date    CHOLHDL 43.4 04/15/2024    CHOLHDL 32.3 09/05/2023    CHOLHDL 25.7 03/17/2023      Lab Results   Component Value Date    TSH 4.300 09/22/2023    FREET4 0.83 09/22/2023     No results found for: "LABA1C", "HGBA1C"  Lab Results   Component Value Date    WBC 6.94 06/25/2021    HGB 13.8 06/25/2021    HCT 40.9 06/25/2021    MCV 96 06/25/2021     06/25/2021         Results for orders placed during the hospital encounter of 04/06/23    Echo Saline Bubble? No    Interpretation Summary  · The left ventricle is normal in size with mild concentric hypertrophy and normal systolic function.  · The estimated ejection fraction is 65%.  · Normal left ventricular diastolic function.  · Mild mitral regurgitation.  · The estimated PA systolic pressure is 24 mmHg.  · Normal right ventricular size with normal right ventricular systolic function.  · Normal central venous pressure (3 mmHg).  · Mild tricuspid regurgitation.     No results found for this or any previous visit.     EKG  Results for orders placed or performed in visit on 03/02/23   IN OFFICE EKG 12-LEAD (to [employer])    Collection Time: 03/02/23  2:49 PM    Narrative    Test Reason : R07.9,    Vent. Rate : 079 BPM     Atrial Rate : 079 BPM     P-R Int : 174 ms          QRS Dur : 086 ms      QT Int : 354 ms       P-R-T Axes : 000 -12 056 degrees     QTc Int : 405 ms    Normal sinus rhythm  Cannot rule out Anterior infarct (cited on or before " 09-FEB-2021)  Abnormal ECG  When compared with ECG of 09-FEB-2021 09:52,  Nonspecific T wave abnormality no longer evident in Inferior leads  Confirmed by Efraín Montero MD ([id number]) on 3/20/2023 3:13:05 PM    Referred By:             Confirmed By:Efraín Montero MD      Stress  Results for orders placed during the hospital encounter of 04/06/23    Nuclear Stress - Cardiology Interpreted    Interpretation Summary    Abnormal myocardial perfusion scan.    There are two significant perfusion abnormalities.    Perfusion Abnormality #1 - There is a moderate intensity, moderate to large sized, reversible perfusion abnormality that is consistent with ischemia in the basal to apical anterior, anteroseptal and anterolateral wall(s) in the typical distribution of the LAD territory.    Perfusion Abnormality #2 - There is a moderate sized, equivocal perfusion abnormality that is fixed in the basal to apical lateral wall(s). This finding is equivocal due to soft tissue shadow.    There are no other significant perfusion abnormalities.    The gated perfusion images showed an ejection fraction of 96% at rest. The gated perfusion images showed an ejection fraction of 89% post stress. Normal ejection fraction is greater than 53%.    There is normal wall motion at rest and post stress.    LV cavity size is normal at rest and normal at stress.    The ECG portion of the study is negative for ischemia.    The patient reported no chest pain during the stress test.    During stress, occasional PVCs are noted.    The patient exercised for 4 minutes 32 seconds on a [place] protocol, corresponding to a functional capacity of 7 METS, achieving a peak heart rate of 134 bpm, which is 98 % of the age predicted maximum heart rate.       Impression:     1.  CAD as above.  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     2.  Low absolute coronary calcium score with intermediate coronary calcium score percentile and decreased arterial age.     3.  Noncardiac  portion of this test will be reported by radiology.        Electronically signed by: Ulices Carrasco MD  Date:                                            08/08/2023      Assessment:       Mixed hyperlipidemia  Well controlled on rosuvastatin 20 mg    Hypertension  Controlled on ramipril.  She is to take the ramipril at bedtime    Aortic atherosclerosis  On a statin therapy as well as ACE inhibitors       Plan:       She is to discuss with Dr. Rao the issue of the thyroid medication and diarrhea.  She is to take the ramipril at bedtime.  She will be seen in 6 months with a lipid CMP and a CBC.

## 2024-08-16 PROBLEM — I70.0 AORTIC ATHEROSCLEROSIS: Status: ACTIVE | Noted: 2024-08-16

## 2024-10-31 ENCOUNTER — LAB VISIT (OUTPATIENT)
Dept: LAB | Facility: HOSPITAL | Age: 79
End: 2024-10-31
Attending: INTERNAL MEDICINE
Payer: MEDICARE

## 2024-10-31 DIAGNOSIS — E03.9 MYXEDEMA HEART DISEASE: Primary | ICD-10-CM

## 2024-10-31 DIAGNOSIS — I51.9 MYXEDEMA HEART DISEASE: Primary | ICD-10-CM

## 2024-10-31 DIAGNOSIS — I51.9 MYXEDEMA HEART DISEASE: ICD-10-CM

## 2024-10-31 DIAGNOSIS — E03.9 MYXEDEMA HEART DISEASE: ICD-10-CM

## 2024-10-31 LAB
T4 FREE SERPL-MCNC: 0.83 NG/DL (ref 0.71–1.51)
TSH SERPL DL<=0.005 MIU/L-ACNC: 3.27 UIU/ML (ref 0.34–5.6)

## 2024-10-31 PROCEDURE — 36415 COLL VENOUS BLD VENIPUNCTURE: CPT | Performed by: INTERNAL MEDICINE

## 2024-10-31 PROCEDURE — 84443 ASSAY THYROID STIM HORMONE: CPT | Performed by: INTERNAL MEDICINE

## 2024-10-31 PROCEDURE — 84439 ASSAY OF FREE THYROXINE: CPT | Performed by: INTERNAL MEDICINE

## 2024-11-04 ENCOUNTER — LAB VISIT (OUTPATIENT)
Dept: LAB | Facility: HOSPITAL | Age: 79
End: 2024-11-04
Attending: INTERNAL MEDICINE
Payer: MEDICARE

## 2024-11-04 DIAGNOSIS — E78.2 MIXED HYPERLIPIDEMIA: ICD-10-CM

## 2024-11-04 DIAGNOSIS — I10 HYPERTENSION, UNSPECIFIED TYPE: ICD-10-CM

## 2024-11-04 DIAGNOSIS — I10 PRIMARY HYPERTENSION: ICD-10-CM

## 2024-11-04 LAB
ALBUMIN SERPL BCP-MCNC: 4.5 G/DL (ref 3.5–5.2)
ALP SERPL-CCNC: 57 U/L (ref 55–135)
ALT SERPL W/O P-5'-P-CCNC: 26 U/L (ref 10–44)
ANION GAP SERPL CALC-SCNC: 8 MMOL/L (ref 8–16)
AST SERPL-CCNC: 28 U/L (ref 10–40)
BILIRUB SERPL-MCNC: 0.7 MG/DL (ref 0.1–1)
BUN SERPL-MCNC: 19 MG/DL (ref 8–23)
CALCIUM SERPL-MCNC: 9.6 MG/DL (ref 8.7–10.5)
CHLORIDE SERPL-SCNC: 106 MMOL/L (ref 95–110)
CHOLEST SERPL-MCNC: 137 MG/DL (ref 120–199)
CHOLEST/HDLC SERPL: 2 {RATIO} (ref 2–5)
CO2 SERPL-SCNC: 26 MMOL/L (ref 23–29)
CREAT SERPL-MCNC: 0.7 MG/DL (ref 0.5–1.4)
ERYTHROCYTE [DISTWIDTH] IN BLOOD BY AUTOMATED COUNT: 12.1 % (ref 11.5–14.5)
EST. GFR  (NO RACE VARIABLE): >60 ML/MIN/1.73 M^2
GLUCOSE SERPL-MCNC: 104 MG/DL (ref 70–110)
HCT VFR BLD AUTO: 39.5 % (ref 37–48.5)
HDLC SERPL-MCNC: 68 MG/DL (ref 40–75)
HDLC SERPL: 49.6 % (ref 20–50)
HGB BLD-MCNC: 12.7 G/DL (ref 12–16)
LDLC SERPL CALC-MCNC: 56.2 MG/DL (ref 63–159)
MAGNESIUM SERPL-MCNC: 1.9 MG/DL (ref 1.6–2.6)
MCH RBC QN AUTO: 31.7 PG (ref 27–31)
MCHC RBC AUTO-ENTMCNC: 32.2 G/DL (ref 32–36)
MCV RBC AUTO: 99 FL (ref 82–98)
NONHDLC SERPL-MCNC: 69 MG/DL
PLATELET # BLD AUTO: 236 K/UL (ref 150–450)
PMV BLD AUTO: 10.8 FL (ref 9.2–12.9)
POTASSIUM SERPL-SCNC: 4.5 MMOL/L (ref 3.5–5.1)
PROT SERPL-MCNC: 7 G/DL (ref 6–8.4)
RBC # BLD AUTO: 4.01 M/UL (ref 4–5.4)
SODIUM SERPL-SCNC: 140 MMOL/L (ref 136–145)
TRIGL SERPL-MCNC: 64 MG/DL (ref 30–150)
WBC # BLD AUTO: 7.44 K/UL (ref 3.9–12.7)

## 2024-11-04 PROCEDURE — 80053 COMPREHEN METABOLIC PANEL: CPT | Performed by: INTERNAL MEDICINE

## 2024-11-04 PROCEDURE — 36415 COLL VENOUS BLD VENIPUNCTURE: CPT | Performed by: INTERNAL MEDICINE

## 2024-11-04 PROCEDURE — 85027 COMPLETE CBC AUTOMATED: CPT | Performed by: INTERNAL MEDICINE

## 2024-11-04 PROCEDURE — 80061 LIPID PANEL: CPT | Performed by: INTERNAL MEDICINE

## 2024-11-04 PROCEDURE — 83735 ASSAY OF MAGNESIUM: CPT | Performed by: INTERNAL MEDICINE

## 2024-11-07 ENCOUNTER — HOSPITAL ENCOUNTER (OUTPATIENT)
Dept: RADIOLOGY | Facility: HOSPITAL | Age: 79
Discharge: HOME | End: 2024-11-07
Attending: SPECIALIST
Payer: MEDICARE

## 2024-11-07 DIAGNOSIS — N20.0 URIC ACID NEPHROLITHIASIS: ICD-10-CM

## 2024-11-07 PROCEDURE — 74018 RADEX ABDOMEN 1 VIEW: CPT | Mod: TC,PO

## 2024-11-07 PROCEDURE — 74018 RADEX ABDOMEN 1 VIEW: CPT | Mod: 26,,, | Performed by: RADIOLOGY

## 2024-12-13 RX ORDER — ROSUVASTATIN CALCIUM 20 MG/1
20 TABLET, FILM COATED ORAL NIGHTLY
Qty: 90 TABLET | Refills: 3 | Status: SHIPPED | OUTPATIENT
Start: 2024-12-13

## 2025-01-09 ENCOUNTER — OFFICE VISIT (OUTPATIENT)
Dept: CARDIOLOGY | Facility: CLINIC | Age: 80
End: 2025-01-09
Payer: MEDICARE

## 2025-01-09 VITALS
SYSTOLIC BLOOD PRESSURE: 122 MMHG | BODY MASS INDEX: 23.66 KG/M2 | DIASTOLIC BLOOD PRESSURE: 60 MMHG | OXYGEN SATURATION: 97 % | HEIGHT: 65 IN | WEIGHT: 142 LBS | HEART RATE: 73 BPM

## 2025-01-09 DIAGNOSIS — I25.10 CORONARY ARTERY DISEASE INVOLVING NATIVE CORONARY ARTERY OF NATIVE HEART WITHOUT ANGINA PECTORIS: ICD-10-CM

## 2025-01-09 DIAGNOSIS — I10 PRIMARY HYPERTENSION: Primary | ICD-10-CM

## 2025-01-09 DIAGNOSIS — E78.2 MIXED HYPERLIPIDEMIA: ICD-10-CM

## 2025-01-09 PROCEDURE — 99213 OFFICE O/P EST LOW 20 MIN: CPT | Mod: PBBFAC,PN | Performed by: INTERNAL MEDICINE

## 2025-01-09 PROCEDURE — 99999 PR PBB SHADOW E&M-EST. PATIENT-LVL III: CPT | Mod: PBBFAC,,, | Performed by: INTERNAL MEDICINE

## 2025-01-09 PROCEDURE — 99213 OFFICE O/P EST LOW 20 MIN: CPT | Mod: S$PBB,,, | Performed by: INTERNAL MEDICINE

## 2025-01-09 NOTE — PROGRESS NOTES
Patient ID:  Yesy Knight is a 79 y.o. female who presents for follow-up of Hyperlipidemia and Results (labs)      Abnormal stress test  The patient denies any symptoms of angina.  CTA was performed that showed no significant CAD her coronary calcium score of 48     Mixed hyperlipidemia  On 20 mg of rosuvastatin.     Hypertension  Controlled on ramipril 2.5 mg daily     8/2/24  She denies any chest pains any shortness of breath.  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  The only problem she is having is that her thyroid medication is giving her diarrhea she has discontinue the medicine.  She has an appointment with Dr. Rao in the next few months.  1/9/25  In general she has been doing fine she denies any chest pains any shortness of breath she remains very active taking care of her 100 acres.  She is having problems with a thyroid medication whenever she takes the levothyroxine she develops diarrhea.  She sees Dr. Pérez and she tells her to take the medication.  She also has problems with esophageal stricture that has been dilated by Dr. Gannon.  She is complaining of hip pains otherwise she is totally asymptomatic.          Past Medical History:   Diagnosis Date    Hyperlipidemia     Hypertension     Wears partial dentures     bottom partials        Past Surgical History:   Procedure Laterality Date    COLPORRHAPHY N/A 02/21/2019    Procedure: COLPORRHAPHY;  Surgeon: Rafael Guevara MD;  Location: [facility] OR;  Service: OB/GYN;  Laterality: N/A;    CYSTOSCOPY N/A 02/21/2019    Procedure: CYSTOSCOPY;  Surgeon: Rafael Guevara MD;  Location: [facility] OR;  Service: OB/GYN;  Laterality: N/A;    HYSTERECTOMY      MOUTH SURGERY  03/2018     second one 10/2018    SACROSPINOUS LIGAMENT FIXATION  02/21/2019    Procedure: FIXATION, LIGAMENT, SACROSPINOUS;  Surgeon: Rafael Guevara MD;  Location: [facility] OR;  Service: OB/GYN;;    STOMACH SURGERY      TOTAL VAGINAL  "HYSTERECTOMY N/A 02/21/2019    Procedure: HYSTERECTOMY, TOTAL, VAGINAL;  Surgeon: Rafael Guevara MD;  Location: [facility];  Service: OB/GYN;  Laterality: N/A;          Current Outpatient Medications   Medication Instructions    cholecalciferol, vitamin D3, (VITAMIN D3 ORAL) Take by mouth.    CRESTOR 20 mg, Oral, Nightly    esomeprazole (NEXIUM) 40 mg, Daily    HYDROcodone-acetaminophen (NORCO) 7.5-325 mg per tablet No dose, route, or frequency recorded.    ibuprofen (ADVIL,MOTRIN) 600 MG tablet No dose, route, or frequency recorded.    levothyroxine (SYNTHROID) 25 MCG tablet TAKE (1) TABLET BY MOUTH ONCE DAILY.    ramipriL (ALTACE) 2.5 mg, Oral, Daily    risedronate (ACTONEL) 150 MG Tab     triamcinolone acetonide 0.1% (KENALOG) 0.1 % cream Apply to rash on ear bid        Review of patient's allergies indicates:  No Known Allergies     Review of Systems   Cardiovascular:  Positive for palpitations. Negative for chest pain and dyspnea on exertion.   Respiratory:  Negative for cough and shortness of breath.    Musculoskeletal:  Positive for joint pain.   Gastrointestinal:  Positive for bloating and diarrhea.        Objective:     Vitals:    01/09/25 1418   BP: 122/60   BP Location: Left arm   Patient Position: Sitting   Pulse: 73   SpO2: 97%   Weight: 64.4 kg (141 lb 15.6 oz)   Height: 5' 5" (1.651 m)       Physical Exam  Vitals and nursing note reviewed.   Constitutional:       Appearance: She is well-developed.   HENT:      Head: Normocephalic and atraumatic.   Eyes:      Conjunctiva/sclera: Conjunctivae normal.   Cardiovascular:      Rate and Rhythm: Normal rate and regular rhythm.      Heart sounds: Normal heart sounds.   Pulmonary:      Effort: Pulmonary effort is normal.      Breath sounds: Normal breath sounds.   Abdominal:      General: Bowel sounds are normal.      Palpations: Abdomen is soft.   Musculoskeletal:         General: Normal range of motion.   Skin:     General: Skin is warm and dry. "   Neurological:      Mental Status: She is alert and oriented to person, place, and time.   Psychiatric:         Behavior: Behavior normal.         Thought Content: Thought content normal.         Judgment: Judgment normal.       CMP  Sodium   Date Value Ref Range Status   11/04/2024 140 136 - 145 mmol/L Final     Potassium   Date Value Ref Range Status   11/04/2024 4.5 3.5 - 5.1 mmol/L Final     Chloride   Date Value Ref Range Status   11/04/2024 106 95 - 110 mmol/L Final     CO2   Date Value Ref Range Status   11/04/2024 26 23 - 29 mmol/L Final     Glucose   Date Value Ref Range Status   11/04/2024 104 70 - 110 mg/dL Final     BUN   Date Value Ref Range Status   11/04/2024 19 8 - 23 mg/dL Final     Creatinine   Date Value Ref Range Status   11/04/2024 0.7 0.5 - 1.4 mg/dL Final     Calcium   Date Value Ref Range Status   11/04/2024 9.6 8.7 - 10.5 mg/dL Final     Total Protein   Date Value Ref Range Status   11/04/2024 7.0 6.0 - 8.4 g/dL Final     Albumin   Date Value Ref Range Status   11/04/2024 4.5 3.5 - 5.2 g/dL Final     Total Bilirubin   Date Value Ref Range Status   11/04/2024 0.7 0.1 - 1.0 mg/dL Final     Comment:     For infants and newborns, interpretation of results should be based  on gestational age, weight and in agreement with clinical  observations.    Premature Infant recommended reference ranges:  Up to 24 hours.............<8.0 mg/dL  Up to 48 hours............<12.0 mg/dL  3-5 days..................<15.0 mg/dL  6-29 days.................<15.0 mg/dL       Alkaline Phosphatase   Date Value Ref Range Status   11/04/2024 57 55 - 135 U/L Final     AST   Date Value Ref Range Status   11/04/2024 28 10 - 40 U/L Final     ALT   Date Value Ref Range Status   11/04/2024 26 10 - 44 U/L Final     Anion Gap   Date Value Ref Range Status   11/04/2024 8 8 - 16 mmol/L Final     eGFR if    Date Value Ref Range Status   06/30/2022 >60.0 >60 mL/min/1.73 m^2 Final     eGFR if non African American  "  Date Value Ref Range Status   06/30/2022 >60.0 >60 mL/min/1.73 m^2 Final     Comment:     Calculation used to obtain the estimated glomerular filtration  rate (eGFR) is the CKD-EPI equation.         BMP  Lab Results   Component Value Date     11/04/2024    K 4.5 11/04/2024     11/04/2024    CO2 26 11/04/2024    BUN 19 11/04/2024    CREATININE 0.7 11/04/2024    CALCIUM 9.6 11/04/2024    ANIONGAP 8 11/04/2024    ESTGFRAFRICA >60.0 06/30/2022    EGFRNONAA >60.0 06/30/2022      BNP  @LABRCNTIP(BNP,BNPTRIAGEBLO)@   Lab Results   Component Value Date    CHOL 137 11/04/2024    CHOL 136 04/15/2024    CHOL 167 09/05/2023     Lab Results   Component Value Date    HDL 68 11/04/2024    HDL 59 04/15/2024    HDL 54 09/05/2023     Lab Results   Component Value Date    LDLCALC 56.2 (L) 11/04/2024    LDLCALC 63.4 04/15/2024    LDLCALC 99.2 09/05/2023     Lab Results   Component Value Date    TRIG 64 11/04/2024    TRIG 68 04/15/2024    TRIG 69 09/05/2023     Lab Results   Component Value Date    CHOLHDL 49.6 11/04/2024    CHOLHDL 43.4 04/15/2024    CHOLHDL 32.3 09/05/2023      Lab Results   Component Value Date    TSH 3.270 10/31/2024    FREET4 0.83 10/31/2024     No results found for: "LABA1C", "HGBA1C"  Lab Results   Component Value Date    WBC 7.44 11/04/2024    HGB 12.7 11/04/2024    HCT 39.5 11/04/2024    MCV 99 (H) 11/04/2024     11/04/2024         Results for orders placed during the hospital encounter of 04/06/23    Echo Saline Bubble? No    Interpretation Summary  · The left ventricle is normal in size with mild concentric hypertrophy and normal systolic function.  · The estimated ejection fraction is 65%.  · Normal left ventricular diastolic function.  · Mild mitral regurgitation.  · The estimated PA systolic pressure is 24 mmHg.  · Normal right ventricular size with normal right ventricular systolic function.  · Normal central venous pressure (3 mmHg).  · Mild tricuspid regurgitation.     No results " found for this or any previous visit.     EKG  Results for orders placed or performed in visit on 03/02/23   IN OFFICE EKG 12-LEAD (to [place])    Collection Time: 03/02/23  2:49 PM    Narrative    Test Reason : R07.9,    Vent. Rate : 079 BPM     Atrial Rate : 079 BPM     P-R Int : 174 ms          QRS Dur : 086 ms      QT Int : 354 ms       P-R-T Axes : 000 -12 056 degrees     QTc Int : 405 ms    Normal sinus rhythm  Cannot rule out Anterior infarct (cited on or before 09-FEB-2021)  Abnormal ECG  When compared with ECG of 09-FEB-2021 09:52,  Nonspecific T wave abnormality no longer evident in Inferior leads  Confirmed by Efraín Montero MD ([id number]) on 3/20/2023 3:13:05 PM    Referred By:             Confirmed By:Efraín Montero MD      Stress  Results for orders placed during the hospital encounter of 04/06/23    Nuclear Stress - Cardiology Interpreted    Interpretation Summary    Abnormal myocardial perfusion scan.    There are two significant perfusion abnormalities.    Perfusion Abnormality #1 - There is a moderate intensity, moderate to large sized, reversible perfusion abnormality that is consistent with ischemia in the basal to apical anterior, anteroseptal and anterolateral wall(s) in the typical distribution of the LAD territory.    Perfusion Abnormality #2 - There is a moderate sized, equivocal perfusion abnormality that is fixed in the basal to apical lateral wall(s). This finding is equivocal due to soft tissue shadow.    There are no other significant perfusion abnormalities.    The gated perfusion images showed an ejection fraction of 96% at rest. The gated perfusion images showed an ejection fraction of 89% post stress. Normal ejection fraction is greater than 53%.    There is normal wall motion at rest and post stress.    LV cavity size is normal at rest and normal at stress.    The ECG portion of the study is negative for ischemia.    The patient reported no chest pain during the stress test.    During  stress, occasional PVCs are noted.    The patient exercised for 4 minutes 32 seconds on a Delmar protocol, corresponding to a functional capacity of 7 METS, achieving a peak heart rate of 134 bpm, which is 98 % of the age predicted maximum heart rate.     Agatston Score: The total coronary calcium score is 48, distributed as left main 0, LAD 47, circumflex 1 and RCA 0.  Approximately 41% of individuals with same age, race and gender would have less coronary calcium than this patient. The calculated arterial age is 67 years.     Cardiac morphology: The left atrium is dilated.  The LV size is normal.  There is lipomatous hypertrophy of the atrial septum.     Coronary CT angiogram: The overall quality of the CT angiographic examination is adequate.  The coronary artery system is right dominant with normal origins. The left main coronary artery has no significant stenosis. The LAD is a tortuous vessel and has mild calcified plaques proximally with less than 25% stenosis.  It has a partial intramyocardial course in its mid/distal portion.  D1 is a large caliber vessel and has calcified plaques proximally with moderate degree stenosis (suboptimal assessment due to artifact/motion). The LCX is the nondominant vessel and has mild calcified plaque at its ostium with less than 25% stenosis.  OM 1 is a large caliber vessel which is tortuous and has a distal intramyocardial course.  The RCA is the dominant vessel and has minimal calcified plaques in distal PDA.     Impression:  Impression:     1.  CAD as above.  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     2.  Low absolute coronary calcium score with intermediate coronary calcium score percentile and decreased arterial age.     3.  Noncardiac portion of this test will be reported by radiology.        Electronically signed by:Ulices Carrasco MD  Date:                                            08/08/2023        Assessment:       Hypertension  Controlled on ramipril    Mixed  hyperlipidemia  Well controlled on rosuvastatin 20 mg    Coronary artery disease involving native coronary artery of native heart without angina pectoris  Nonobstructive coronary artery disease.  No symptoms of angina       Plan:       Continue the rosuvastatin for cholesterol therapy.  Continue ramipril 2.5 mg for hypertension.  She will be seen in the office in a proximally 6 months.

## 2025-01-10 PROBLEM — I25.10 CORONARY ARTERY DISEASE INVOLVING NATIVE CORONARY ARTERY OF NATIVE HEART WITHOUT ANGINA PECTORIS: Status: ACTIVE | Noted: 2025-01-10

## 2025-03-21 DIAGNOSIS — M54.16 RADICULOPATHY, LUMBAR REGION: Primary | ICD-10-CM

## 2025-03-25 ENCOUNTER — HOSPITAL ENCOUNTER (OUTPATIENT)
Dept: RADIOLOGY | Facility: HOSPITAL | Age: 80
Discharge: HOME | End: 2025-03-25
Attending: NEUROLOGICAL SURGERY
Payer: MEDICARE

## 2025-03-25 DIAGNOSIS — M54.16 RADICULOPATHY, LUMBAR REGION: ICD-10-CM

## 2025-03-25 PROCEDURE — 72148 MRI LUMBAR SPINE W/O DYE: CPT | Mod: TC,PO

## 2025-03-25 PROCEDURE — 72148 MRI LUMBAR SPINE W/O DYE: CPT | Mod: 26,,, | Performed by: RADIOLOGY

## 2025-05-27 RX ORDER — RAMIPRIL 2.5 MG/1
2.5 CAPSULE ORAL DAILY
Qty: 90 CAPSULE | Refills: 3 | Status: SHIPPED | OUTPATIENT
Start: 2025-05-27

## 2025-05-27 NOTE — TELEPHONE ENCOUNTER
----- Message from Caitlin sent at 5/27/2025  3:00 PM CDT -----  Regarding: refill  Type:  RX Refill RequestWho Called: ptRefill or New Rx:refillRX Name and Strength: ramipriL (ALTACE) 2.5 MG capsule 90 capsule 3 11/27/2023 - Sig - Route: Take 1 capsule (2.5 mg total) by mouth once daily. - Oral Sent to pharmacy as: ramipriL (ALTACE) 2.5 MG capsule E-Prescribing Status: Receipt confirmed by pharmacy (11/27/2023  2:36 PM CST) How is the patient currently taking it? (ex. 1XDay):see aboveIs this a 30 day or 90 day RX:see abovePreferred Pharmacy with phone number:[facility] - [place], [place] - [address] [address]Nmpvy: [phone] Fax: [phone] or Mail Order:local Ordering Provider:Radha Call Back Number:[phone] Information: pt is out of meds. Please call to discuss.

## 2025-07-02 ENCOUNTER — OFFICE VISIT (OUTPATIENT)
Dept: CARDIOLOGY | Facility: CLINIC | Age: 80
End: 2025-07-02
Payer: MEDICARE

## 2025-07-02 VITALS
OXYGEN SATURATION: 99 % | DIASTOLIC BLOOD PRESSURE: 60 MMHG | SYSTOLIC BLOOD PRESSURE: 122 MMHG | HEIGHT: 65 IN | WEIGHT: 140.63 LBS | BODY MASS INDEX: 23.43 KG/M2 | HEART RATE: 68 BPM

## 2025-07-02 DIAGNOSIS — I25.10 CORONARY ARTERY DISEASE INVOLVING NATIVE CORONARY ARTERY OF NATIVE HEART WITHOUT ANGINA PECTORIS: ICD-10-CM

## 2025-07-02 DIAGNOSIS — Z01.810 PREOP CARDIOVASCULAR EXAM: Primary | ICD-10-CM

## 2025-07-02 DIAGNOSIS — E89.0 POSTABLATIVE HYPOTHYROIDISM: ICD-10-CM

## 2025-07-02 DIAGNOSIS — E78.2 MIXED HYPERLIPIDEMIA: ICD-10-CM

## 2025-07-02 DIAGNOSIS — I10 PRIMARY HYPERTENSION: ICD-10-CM

## 2025-07-02 LAB
OHS QRS DURATION: 84 MS
OHS QTC CALCULATION: 417 MS

## 2025-07-02 PROCEDURE — 99213 OFFICE O/P EST LOW 20 MIN: CPT | Mod: PBBFAC,PN | Performed by: INTERNAL MEDICINE

## 2025-07-02 PROCEDURE — 99999 PR PBB SHADOW E&M-EST. PATIENT-LVL III: CPT | Mod: PBBFAC,,, | Performed by: INTERNAL MEDICINE

## 2025-07-02 PROCEDURE — 99214 OFFICE O/P EST MOD 30 MIN: CPT | Mod: S$PBB,,, | Performed by: INTERNAL MEDICINE

## 2025-07-02 NOTE — PROGRESS NOTES
Patient ID:  Yesy Knight is a 80 y.o. female who presents with Hypertension and Hyperlipidemia      History of Present Illness    CHIEF COMPLAINT:  Patient presents today for back pain with curvature and nerve compression.    MUSCULOSKELETAL:  She reports pain that limits activities such as cooking and doing dishes. Pain improves with rest but is exacerbated by driving. She maintains a high activity level, often staying up late to complete tasks such as food preservation, though acknowledges potential need for moderation.    CHEST PAIN:  She experiences occasional chest wall discomfort, particularly when working outside and lifting heavy objects. The discomfort is reproducible with palpation of the chest wall. She denies severe chest pain.    CARDIOVASCULAR:  She has a known history of cardiac murmur present for several years. She denies exertional symptoms and can navigate two-story house without dyspnea. CT angiogram showed less than 25% stenosis of the LAD and circumflex coronary artery, with other vessels normal. She denies AF.    MEDICATIONS:  Current medications include Crestor, Ramipril, Actonel, and Levothyroxine.    THYROID:  She reports significant GI side effects from Levothyroxine including stomach aches and prolonged bathroom visits until noon. She manages symptoms with Pepto-Bismol tablets and intermittently discontinues medication for brief periods to alleviate side effects. She has discussed these concerns with her prescribing physician. October thyroid labs were within acceptable range.    OTHER SYMPTOMS:  She experiences sweating predominantly on one side of her body.      ROS:  Gastrointestinal: +abdominal pain  Musculoskeletal: +back pain, +nerve pain, +pain with movement  Skin: +excessive sweating           Past Medical History:   Diagnosis Date    Hyperlipidemia     Hypertension     Wears partial dentures     bottom partials        Past Surgical History:   Procedure Laterality Date     "COLPORRHAPHY N/A 02/21/2019    Procedure: COLPORRHAPHY;  Surgeon: Rafael Guevara MD;  Location: [facility] OR;  Service: OB/GYN;  Laterality: N/A;    CYSTOSCOPY N/A 02/21/2019    Procedure: CYSTOSCOPY;  Surgeon: Rafael Guevara MD;  Location: [facility] OR;  Service: OB/GYN;  Laterality: N/A;    HYSTERECTOMY      MOUTH SURGERY  03/2018     second one 10/2018    SACROSPINOUS LIGAMENT FIXATION  02/21/2019    Procedure: FIXATION, LIGAMENT, SACROSPINOUS;  Surgeon: Rafael Guevara MD;  Location: [facility] OR;  Service: OB/GYN;;    STOMACH SURGERY      TOTAL VAGINAL HYSTERECTOMY N/A 02/21/2019    Procedure: HYSTERECTOMY, TOTAL, VAGINAL;  Surgeon: Rafael Guevara MD;  Location: [facility] OR;  Service: OB/GYN;  Laterality: N/A;          Current Outpatient Medications   Medication Instructions    cholecalciferol, vitamin D3, (VITAMIN D3 ORAL) Take by mouth.    CRESTOR 20 mg, Oral, Nightly    esomeprazole (NEXIUM) 40 mg, Daily    HYDROcodone-acetaminophen (NORCO) 7.5-325 mg per tablet No dose, route, or frequency recorded.    ibuprofen (ADVIL,MOTRIN) 600 MG tablet No dose, route, or frequency recorded.    levothyroxine (SYNTHROID) 25 MCG tablet TAKE (1) TABLET BY MOUTH ONCE DAILY.    ramipriL (ALTACE) 2.5 mg, Oral, Daily    risedronate (ACTONEL) 150 MG Tab     triamcinolone acetonide 0.1% (KENALOG) 0.1 % cream Apply to rash on ear bid        Review of patient's allergies indicates:  No Known Allergies        Objective:     Vitals:    07/02/25 0819   BP: 122/60   BP Location: Left arm   Patient Position: Sitting   Pulse: 68   SpO2: 99%   Weight: 63.8 kg (140 lb 10.5 oz)   Height: 5' 5" (1.651 m)        Physical Exam    Vitals: Blood pressure: 122/60. Pulse: 68.  General: No acute distress. Well-developed. Well-nourished.  Eyes: EOMI. Sclerae anicteric.  HENT: Normocephalic. Atraumatic. Nares patent. Moist oral mucosa.  Cardiovascular: Regular rate. Regular rhythm. Grade 2/6 systolic murmur at the base. No rubs. No gallops. Normal S1, " "S2.  Respiratory: Normal respiratory effort. Clear to auscultation bilaterally. No rales. No rhonchi. No wheezing.  Musculoskeletal: No  obvious deformity.  Extremities: No lower extremity edema.  Neurological: Alert & oriented x3. No slurred speech. Normal gait.  Psychiatric: Normal mood. Normal affect. Good insight. Good judgment.  Skin: Warm. Dry. No rash.         CARDIAC PROFILE  No results found for: "BNP", "CPK", "CPKMB", "LDH", "TROPONINI", "TROPONINIHS"  CMP  Sodium   Date Value Ref Range Status   11/04/2024 140 136 - 145 mmol/L Final     Potassium   Date Value Ref Range Status   11/04/2024 4.5 3.5 - 5.1 mmol/L Final     Chloride   Date Value Ref Range Status   11/04/2024 106 95 - 110 mmol/L Final     CO2   Date Value Ref Range Status   11/04/2024 26 23 - 29 mmol/L Final     Glucose   Date Value Ref Range Status   11/04/2024 104 70 - 110 mg/dL Final     BUN   Date Value Ref Range Status   11/04/2024 19 8 - 23 mg/dL Final     Creatinine   Date Value Ref Range Status   11/04/2024 0.7 0.5 - 1.4 mg/dL Final     Calcium   Date Value Ref Range Status   11/04/2024 9.6 8.7 - 10.5 mg/dL Final     Total Protein   Date Value Ref Range Status   11/04/2024 7.0 6.0 - 8.4 g/dL Final     Albumin   Date Value Ref Range Status   11/04/2024 4.5 3.5 - 5.2 g/dL Final     Total Bilirubin   Date Value Ref Range Status   11/04/2024 0.7 0.1 - 1.0 mg/dL Final     Comment:     For infants and newborns, interpretation of results should be based  on gestational age, weight and in agreement with clinical  observations.    Premature Infant recommended reference ranges:  Up to 24 hours.............<8.0 mg/dL  Up to 48 hours............<12.0 mg/dL  3-5 days..................<15.0 mg/dL  6-29 days.................<15.0 mg/dL       Alkaline Phosphatase   Date Value Ref Range Status   11/04/2024 57 55 - 135 U/L Final     AST   Date Value Ref Range Status   11/04/2024 28 10 - 40 U/L Final     ALT   Date Value Ref Range Status   11/04/2024 26 10 " "- 44 U/L Final     Anion Gap   Date Value Ref Range Status   11/04/2024 8 8 - 16 mmol/L Final     eGFR   Date Value Ref Range Status   11/04/2024 >60.0 >60 mL/min/1.73 m^2 Final      Lab Results   Component Value Date    CHOL 137 11/04/2024    CHOL 136 04/15/2024    CHOL 167 09/05/2023     Lab Results   Component Value Date    HDL 68 11/04/2024    HDL 59 04/15/2024    HDL 54 09/05/2023     Lab Results   Component Value Date    LDLCALC 56.2 (L) 11/04/2024    LDLCALC 63.4 04/15/2024    LDLCALC 99.2 09/05/2023     Lab Results   Component Value Date    TRIG 64 11/04/2024    TRIG 68 04/15/2024    TRIG 69 09/05/2023     Lab Results   Component Value Date    CHOLHDL 49.6 11/04/2024    CHOLHDL 43.4 04/15/2024    CHOLHDL 32.3 09/05/2023      Lab Results   Component Value Date    TSH 3.270 10/31/2024    FREET4 0.83 10/31/2024     No results found for: "LABA1C", "HGBA1C"  Lab Results   Component Value Date    WBC 7.44 11/04/2024    HGB 12.7 11/04/2024    HCT 39.5 11/04/2024    MCV 99 (H) 11/04/2024     11/04/2024        Results for orders placed during the hospital encounter of 04/06/23    Echo Saline Bubble? No    Interpretation Summary  · The left ventricle is normal in size with mild concentric hypertrophy and normal systolic function.  · The estimated ejection fraction is 65%.  · Normal left ventricular diastolic function.  · Mild mitral regurgitation.  · The estimated PA systolic pressure is 24 mmHg.  · Normal right ventricular size with normal right ventricular systolic function.  · Normal central venous pressure (3 mmHg).  · Mild tricuspid regurgitation.     No results found for this or any previous visit.       EKG  Results for orders placed or performed in visit on 07/02/25   IN OFFICE EKG 12-LEAD (to [place])    Collection Time: 07/02/25  8:24 AM   Result Value Ref Range    QRS Duration 84 ms    [facility] QTC Calculation 417 ms    Narrative    Test Reason : Z01.810,    Vent. Rate :  66 BPM     Atrial Rate :  66 BPM     " P-R Int : 210 ms          QRS Dur :  84 ms      QT Int : 398 ms       P-R-T Axes :  11   4  71 degrees    QTcB Int : 417 ms    Sinus rhythm with 1st degree A-V block  Septal infarct (cited on or before 09-Feb-2021)  Abnormal ECG  When compared with ECG of 02-Mar-2023 14:49,  TN interval has increased  Questionable change in initial forces of Anterior-septal leads    Referred By: AAAREFERRAL SELF           Confirmed By:         Stress  Results for orders placed during the hospital encounter of 04/06/23    Nuclear Stress - Cardiology Interpreted    Interpretation Summary    Abnormal myocardial perfusion scan.    There are two significant perfusion abnormalities.    Perfusion Abnormality #1 - There is a moderate intensity, moderate to large sized, reversible perfusion abnormality that is consistent with ischemia in the basal to apical anterior, anteroseptal and anterolateral wall(s) in the typical distribution of the LAD territory.    Perfusion Abnormality #2 - There is a moderate sized, equivocal perfusion abnormality that is fixed in the basal to apical lateral wall(s). This finding is equivocal due to soft tissue shadow.    There are no other significant perfusion abnormalities.    The gated perfusion images showed an ejection fraction of 96% at rest. The gated perfusion images showed an ejection fraction of 89% post stress. Normal ejection fraction is greater than 53%.    There is normal wall motion at rest and post stress.    LV cavity size is normal at rest and normal at stress.    The ECG portion of the study is negative for ischemia.    The patient reported no chest pain during the stress test.    During stress, occasional PVCs are noted.    The patient exercised for 4 minutes 32 seconds on a [place] protocol, corresponding to a functional capacity of 7 METS, achieving a peak heart rate of 134 bpm, which is 98 % of the age predicted maximum heart rate.     Impression:  Impression:     1.  CAD as above.   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     2.  Low absolute coronary calcium score with intermediate coronary calcium score percentile and decreased arterial age.     3.  Noncardiac portion of this test will be reported by radiology.        Electronically signed by:Ulices Carrasco MD  Date:                                            08/08/2023      Assessment/Plan:          1. Preop cardiovascular exam    2. Coronary artery disease involving native coronary artery of native heart without angina pectoris    3. Mixed hyperlipidemia    4. Primary hypertension    5. Postablative hypothyroidism      Assessment & Plan    I25.10 Atherosclerotic heart disease of native coronary artery without angina pectoris  I35.8 Other nonrheumatic aortic valve disorders  R07.89 Other chest pain  M54.50 Low back pain, unspecified  E03.9 Hypothyroidism, unspecified    PLAN SUMMARY:  - Continue Crestor, Ramipril, and Actonel at current doses  - Continue thyroid medication at current dose  - Take thyroid medication immediately upon waking, before coffee or food  - Cleared for upcoming surgery with Dr. Sandoval  - Stay indoors after 12:00 during summer months  - Patient educated on differentiating between cardiac and musculoskeletal chest pain  - Follow up in 6 months    ATHEROSCLEROTIC HEART DISEASE:  - Low calcium score of 48, less than 25% stenosis in LAD and circumflex vessels from CT angiogram.  - Continued Crestor, Ramipril at current doses.    AORTIC VALVE DISORDER:  - Grade 2/6 systolic murmur at the base, likely due to mild aortic valve sclerosis.    CHEST PAIN:  - Chest discomfort likely musculoskeletal given it worsens with pressure and improves with rest.  - Educated patient on differentiating between cardiac and musculoskeletal chest pain.    HYPOTHYROIDISM:  - Continued thyroid medication at current dose.  - Take thyroid medication immediately upon waking, before coffee or food.  Discussed with Dr. Rao the possibility of changing to  another form of thyroid medication.    SURGICAL PREPARATION:  - Patient suitable for upcoming surgery with Dr. Sandoval based on recent test results.    FOLLOW-UP:  - Follow up in 6 months.           This note was generated with the assistance of ambient listening technology. Verbal consent was obtained by the patient and accompanying visitor(s) for the recording of patient appointment to facilitate this note. I attest to having reviewed and edited the generated note for accuracy, though some syntax or spelling errors may persist. Please contact the author of this note for any clarification.

## 2025-07-03 DIAGNOSIS — Z12.31 OTHER SCREENING MAMMOGRAM: Primary | ICD-10-CM

## 2025-07-14 ENCOUNTER — HOSPITAL ENCOUNTER (OUTPATIENT)
Dept: RADIOLOGY | Facility: HOSPITAL | Age: 80
Discharge: HOME | End: 2025-07-14
Attending: INTERNAL MEDICINE
Payer: MEDICARE

## 2025-07-14 VITALS — WEIGHT: 140 LBS | BODY MASS INDEX: 23.32 KG/M2 | HEIGHT: 65 IN

## 2025-07-14 DIAGNOSIS — Z12.31 OTHER SCREENING MAMMOGRAM: ICD-10-CM

## 2025-07-14 PROCEDURE — 77063 BREAST TOMOSYNTHESIS BI: CPT | Mod: TC,PO

## 2025-07-14 PROCEDURE — 77063 BREAST TOMOSYNTHESIS BI: CPT | Mod: 26,,, | Performed by: RADIOLOGY

## 2025-07-14 PROCEDURE — 77067 SCR MAMMO BI INCL CAD: CPT | Mod: 26,,, | Performed by: RADIOLOGY

## (undated) DEVICE — SPONGE DERMACEA GAUZE 4X4

## (undated) DEVICE — SUT CAPIO ASORB 0 48IN TC D

## (undated) DEVICE — SEE MEDLINE ITEM 157116

## (undated) DEVICE — LUBRICANT SURGILUBE 2 OZ

## (undated) DEVICE — CATH URETHRAL 16FR RED

## (undated) DEVICE — SET CYSTO IRRIGATION UNIV SPIK

## (undated) DEVICE — SOL PVP-I SCRUB 7.5% 4OZ

## (undated) DEVICE — TRAY DRY SPONGE SCRUB W FOAM

## (undated) DEVICE — SUT PDS II 0 CT-1 VIL MONO

## (undated) DEVICE — CATH BONANO SUPRAPUBIC

## (undated) DEVICE — COVER SURG LIGHT HANDLE

## (undated) DEVICE — SUT 1 36IN PDS II

## (undated) DEVICE — SUT CTD VICRYL CT-1 27

## (undated) DEVICE — SEE MEDLINE ITEM 157181

## (undated) DEVICE — CONTAINER SPECIMEN STRL 4OZ

## (undated) DEVICE — SEE MEDLINE ITEM 157131

## (undated) DEVICE — GLOVE PROTEXIS PI CLASSIC 7.5

## (undated) DEVICE — SUT 3-0 VICRYL / SH (J416)

## (undated) DEVICE — Device

## (undated) DEVICE — SLEEVE SCD EXPRESS CALF MEDIUM

## (undated) DEVICE — DEVICE CAPIO SLIM SUTURE CAPT

## (undated) DEVICE — SOL 9P NACL IRR PIC IL

## (undated) DEVICE — SUT 2-0 ETHILON 18 FS

## (undated) DEVICE — CLIPPER BLADE MOD 4406 (CAREF)

## (undated) DEVICE — SET EXTENSION STERILE 30IN

## (undated) DEVICE — PACK CUSTOM UNIV BASIN SLI

## (undated) DEVICE — NDL SAFETY 25G X 1.5 ECLIPSE

## (undated) DEVICE — GLOVE PROTEXIS PI CLASSIC 7.0

## (undated) DEVICE — SYR 50ML CATH TIP

## (undated) DEVICE — SYR 10CC LUER LOCK

## (undated) DEVICE — ELECTRODE REM PLYHSV RETURN 9

## (undated) DEVICE — STRAP OR TABLE 5IN X 72IN

## (undated) DEVICE — PAD OB HS-77 STRL PREPACK 12S

## (undated) DEVICE — SEE MEDLINE ITEM 152622

## (undated) DEVICE — SCRUB 10% POVIDONE IODINE 4OZ

## (undated) DEVICE — SUT 2-0 VICRYL / CT-1